# Patient Record
Sex: FEMALE | Race: WHITE | NOT HISPANIC OR LATINO | Employment: OTHER | URBAN - METROPOLITAN AREA
[De-identification: names, ages, dates, MRNs, and addresses within clinical notes are randomized per-mention and may not be internally consistent; named-entity substitution may affect disease eponyms.]

---

## 2017-04-18 ENCOUNTER — COMMUNICATION - HEALTHEAST (OUTPATIENT)
Dept: TELEHEALTH | Facility: CLINIC | Age: 27
End: 2017-04-18

## 2017-04-18 ENCOUNTER — OFFICE VISIT - HEALTHEAST (OUTPATIENT)
Dept: FAMILY MEDICINE | Facility: CLINIC | Age: 27
End: 2017-04-18

## 2017-04-18 DIAGNOSIS — Z76.89 ENCOUNTER TO ESTABLISH CARE: ICD-10-CM

## 2017-04-18 DIAGNOSIS — Z00.00 ANNUAL PHYSICAL EXAM: ICD-10-CM

## 2017-04-18 DIAGNOSIS — Z30.015 ENCOUNTER FOR INITIAL PRESCRIPTION OF VAGINAL RING HORMONAL CONTRACEPTIVE: ICD-10-CM

## 2017-04-18 ASSESSMENT — MIFFLIN-ST. JEOR: SCORE: 1633.48

## 2017-04-23 ENCOUNTER — OFFICE VISIT - HEALTHEAST (OUTPATIENT)
Dept: FAMILY MEDICINE | Facility: CLINIC | Age: 27
End: 2017-04-23

## 2017-04-23 DIAGNOSIS — J20.8 VIRAL BRONCHITIS: ICD-10-CM

## 2017-04-27 ENCOUNTER — AMBULATORY - HEALTHEAST (OUTPATIENT)
Dept: LAB | Facility: CLINIC | Age: 27
End: 2017-04-27

## 2017-04-27 DIAGNOSIS — Z00.00 ANNUAL PHYSICAL EXAM: ICD-10-CM

## 2017-04-27 LAB
BKR LAB AP ABNORMAL BLEEDING: NO
BKR LAB AP BIRTH CONTROL/HORMONES: NORMAL
BKR LAB AP CERVICAL APPEARANCE: NORMAL
BKR LAB AP GYN ADEQUACY: NORMAL
BKR LAB AP GYN INTERPRETATION: NORMAL
BKR LAB AP GYN OTHER FINDINGS: NORMAL
BKR LAB AP HPV REFLEX: NORMAL
BKR LAB AP LMP: NORMAL
BKR LAB AP PATIENT STATUS: NORMAL
BKR LAB AP PREVIOUS ABNORMAL: NO
BKR LAB AP PREVIOUS NORMAL: NORMAL
CHOLEST SERPL-MCNC: 269 MG/DL
FASTING STATUS PATIENT QL REPORTED: YES
HDLC SERPL-MCNC: 48 MG/DL
HIGH RISK?: NO
LDLC SERPL CALC-MCNC: 205 MG/DL
PATH REPORT.COMMENTS IMP SPEC: NORMAL
RESULT FLAG (HE HISTORICAL CONVERSION): NORMAL
TRIGL SERPL-MCNC: 81 MG/DL

## 2017-04-28 ENCOUNTER — COMMUNICATION - HEALTHEAST (OUTPATIENT)
Dept: FAMILY MEDICINE | Facility: CLINIC | Age: 27
End: 2017-04-28

## 2017-06-19 ENCOUNTER — COMMUNICATION - HEALTHEAST (OUTPATIENT)
Dept: FAMILY MEDICINE | Facility: CLINIC | Age: 27
End: 2017-06-19

## 2017-06-19 ENCOUNTER — OFFICE VISIT - HEALTHEAST (OUTPATIENT)
Dept: FAMILY MEDICINE | Facility: CLINIC | Age: 27
End: 2017-06-19

## 2017-06-19 DIAGNOSIS — T14.90XA INJURY: ICD-10-CM

## 2017-06-19 DIAGNOSIS — R21 SKIN RASH: ICD-10-CM

## 2017-06-21 ENCOUNTER — COMMUNICATION - HEALTHEAST (OUTPATIENT)
Dept: FAMILY MEDICINE | Facility: CLINIC | Age: 27
End: 2017-06-21

## 2017-06-21 ENCOUNTER — AMBULATORY - HEALTHEAST (OUTPATIENT)
Dept: FAMILY MEDICINE | Facility: CLINIC | Age: 27
End: 2017-06-21

## 2017-07-17 ENCOUNTER — AMBULATORY - HEALTHEAST (OUTPATIENT)
Dept: FAMILY MEDICINE | Facility: CLINIC | Age: 27
End: 2017-07-17

## 2018-06-08 ENCOUNTER — OFFICE VISIT - HEALTHEAST (OUTPATIENT)
Dept: FAMILY MEDICINE | Facility: CLINIC | Age: 28
End: 2018-06-08

## 2018-06-08 DIAGNOSIS — J98.01 BRONCHOSPASM: ICD-10-CM

## 2018-06-08 DIAGNOSIS — J06.9 VIRAL URI WITH COUGH: ICD-10-CM

## 2018-06-08 ASSESSMENT — MIFFLIN-ST. JEOR: SCORE: 1708.33

## 2018-10-05 ENCOUNTER — OFFICE VISIT - HEALTHEAST (OUTPATIENT)
Dept: FAMILY MEDICINE | Facility: CLINIC | Age: 28
End: 2018-10-05

## 2018-10-05 DIAGNOSIS — R21 RASH: ICD-10-CM

## 2018-10-29 ENCOUNTER — OFFICE VISIT - HEALTHEAST (OUTPATIENT)
Dept: FAMILY MEDICINE | Facility: CLINIC | Age: 28
End: 2018-10-29

## 2018-10-29 DIAGNOSIS — G43.709 CHRONIC MIGRAINE WITHOUT AURA WITHOUT STATUS MIGRAINOSUS, NOT INTRACTABLE: ICD-10-CM

## 2019-03-14 ENCOUNTER — OFFICE VISIT - HEALTHEAST (OUTPATIENT)
Dept: FAMILY MEDICINE | Facility: CLINIC | Age: 29
End: 2019-03-14

## 2019-03-14 DIAGNOSIS — R10.84 ABDOMINAL PAIN, GENERALIZED: ICD-10-CM

## 2019-03-15 ENCOUNTER — AMBULATORY - HEALTHEAST (OUTPATIENT)
Dept: LAB | Facility: CLINIC | Age: 29
End: 2019-03-15

## 2019-03-15 DIAGNOSIS — R10.84 ABDOMINAL PAIN, GENERALIZED: ICD-10-CM

## 2019-03-18 LAB
G LAMBLIA AG STL QL IA: NORMAL
H PYLORI AG STL QL IA: NORMAL
O+P STL MICRO: NORMAL
REPORT STATUS: NORMAL
SPECIMEN DESCRIPTION: NORMAL

## 2019-03-19 ENCOUNTER — COMMUNICATION - HEALTHEAST (OUTPATIENT)
Dept: FAMILY MEDICINE | Facility: CLINIC | Age: 29
End: 2019-03-19

## 2019-03-21 ENCOUNTER — AMBULATORY - HEALTHEAST (OUTPATIENT)
Dept: LAB | Facility: CLINIC | Age: 29
End: 2019-03-21

## 2019-03-21 DIAGNOSIS — R10.84 ABDOMINAL PAIN, GENERALIZED: ICD-10-CM

## 2019-03-21 LAB
HEMOCCULT SP1 STL QL: NEGATIVE
HEMOCCULT SP2 STL QL: NEGATIVE
HEMOCCULT SP3 STL QL: NEGATIVE

## 2019-03-25 ENCOUNTER — COMMUNICATION - HEALTHEAST (OUTPATIENT)
Dept: FAMILY MEDICINE | Facility: CLINIC | Age: 29
End: 2019-03-25

## 2019-03-27 ENCOUNTER — COMMUNICATION - HEALTHEAST (OUTPATIENT)
Dept: FAMILY MEDICINE | Facility: CLINIC | Age: 29
End: 2019-03-27

## 2019-04-15 ENCOUNTER — COMMUNICATION - HEALTHEAST (OUTPATIENT)
Dept: FAMILY MEDICINE | Facility: CLINIC | Age: 29
End: 2019-04-15

## 2019-04-15 DIAGNOSIS — G43.709 CHRONIC MIGRAINE WITHOUT AURA WITHOUT STATUS MIGRAINOSUS, NOT INTRACTABLE: ICD-10-CM

## 2019-07-24 ENCOUNTER — OFFICE VISIT - HEALTHEAST (OUTPATIENT)
Dept: FAMILY MEDICINE | Facility: CLINIC | Age: 29
End: 2019-07-24

## 2019-07-24 DIAGNOSIS — M54.6 CHRONIC MIDLINE THORACIC BACK PAIN: ICD-10-CM

## 2019-07-24 DIAGNOSIS — G89.29 CHRONIC MIDLINE THORACIC BACK PAIN: ICD-10-CM

## 2019-07-26 ENCOUNTER — OFFICE VISIT - HEALTHEAST (OUTPATIENT)
Dept: PHYSICAL THERAPY | Facility: REHABILITATION | Age: 29
End: 2019-07-26

## 2019-07-26 DIAGNOSIS — M54.2 CHRONIC MIDLINE POSTERIOR NECK PAIN: ICD-10-CM

## 2019-07-26 DIAGNOSIS — G89.29 CHRONIC MIDLINE POSTERIOR NECK PAIN: ICD-10-CM

## 2019-07-26 DIAGNOSIS — M54.12 CERVICAL RADICULITIS: ICD-10-CM

## 2019-07-26 DIAGNOSIS — M79.18 MYOFASCIAL PAIN: ICD-10-CM

## 2019-07-30 ENCOUNTER — OFFICE VISIT - HEALTHEAST (OUTPATIENT)
Dept: PHYSICAL THERAPY | Facility: REHABILITATION | Age: 29
End: 2019-07-30

## 2019-07-30 DIAGNOSIS — G89.29 CHRONIC MIDLINE POSTERIOR NECK PAIN: ICD-10-CM

## 2019-07-30 DIAGNOSIS — M79.18 MYOFASCIAL PAIN: ICD-10-CM

## 2019-07-30 DIAGNOSIS — M54.12 CERVICAL RADICULITIS: ICD-10-CM

## 2019-07-30 DIAGNOSIS — M54.2 CHRONIC MIDLINE POSTERIOR NECK PAIN: ICD-10-CM

## 2019-08-02 ENCOUNTER — OFFICE VISIT - HEALTHEAST (OUTPATIENT)
Dept: PHYSICAL THERAPY | Facility: REHABILITATION | Age: 29
End: 2019-08-02

## 2019-08-02 DIAGNOSIS — M79.18 MYOFASCIAL PAIN: ICD-10-CM

## 2019-08-02 DIAGNOSIS — G89.29 CHRONIC MIDLINE POSTERIOR NECK PAIN: ICD-10-CM

## 2019-08-02 DIAGNOSIS — M54.12 CERVICAL RADICULITIS: ICD-10-CM

## 2019-08-02 DIAGNOSIS — M54.2 CHRONIC MIDLINE POSTERIOR NECK PAIN: ICD-10-CM

## 2019-08-06 ENCOUNTER — OFFICE VISIT - HEALTHEAST (OUTPATIENT)
Dept: PHYSICAL THERAPY | Facility: REHABILITATION | Age: 29
End: 2019-08-06

## 2019-08-06 DIAGNOSIS — M54.12 CERVICAL RADICULITIS: ICD-10-CM

## 2019-08-06 DIAGNOSIS — G89.29 CHRONIC MIDLINE POSTERIOR NECK PAIN: ICD-10-CM

## 2019-08-06 DIAGNOSIS — M54.2 CHRONIC MIDLINE POSTERIOR NECK PAIN: ICD-10-CM

## 2019-08-06 DIAGNOSIS — M79.18 MYOFASCIAL PAIN: ICD-10-CM

## 2019-08-13 ENCOUNTER — OFFICE VISIT - HEALTHEAST (OUTPATIENT)
Dept: PHYSICAL THERAPY | Facility: REHABILITATION | Age: 29
End: 2019-08-13

## 2019-08-13 DIAGNOSIS — M54.2 CHRONIC MIDLINE POSTERIOR NECK PAIN: ICD-10-CM

## 2019-08-13 DIAGNOSIS — M79.18 MYOFASCIAL PAIN: ICD-10-CM

## 2019-08-13 DIAGNOSIS — M54.12 CERVICAL RADICULITIS: ICD-10-CM

## 2019-08-13 DIAGNOSIS — G89.29 CHRONIC MIDLINE POSTERIOR NECK PAIN: ICD-10-CM

## 2019-08-27 ENCOUNTER — OFFICE VISIT - HEALTHEAST (OUTPATIENT)
Dept: PHYSICAL THERAPY | Facility: REHABILITATION | Age: 29
End: 2019-08-27

## 2019-08-27 DIAGNOSIS — M54.2 CHRONIC MIDLINE POSTERIOR NECK PAIN: ICD-10-CM

## 2019-08-27 DIAGNOSIS — M54.12 CERVICAL RADICULITIS: ICD-10-CM

## 2019-08-27 DIAGNOSIS — M79.18 MYOFASCIAL PAIN: ICD-10-CM

## 2019-08-27 DIAGNOSIS — G89.29 CHRONIC MIDLINE POSTERIOR NECK PAIN: ICD-10-CM

## 2019-09-06 ENCOUNTER — OFFICE VISIT - HEALTHEAST (OUTPATIENT)
Dept: PHYSICAL THERAPY | Facility: REHABILITATION | Age: 29
End: 2019-09-06

## 2019-09-06 DIAGNOSIS — M79.18 MYOFASCIAL PAIN: ICD-10-CM

## 2019-09-06 DIAGNOSIS — G89.29 CHRONIC MIDLINE POSTERIOR NECK PAIN: ICD-10-CM

## 2019-09-06 DIAGNOSIS — M54.2 CHRONIC MIDLINE POSTERIOR NECK PAIN: ICD-10-CM

## 2019-09-06 DIAGNOSIS — M54.12 CERVICAL RADICULITIS: ICD-10-CM

## 2019-09-10 ENCOUNTER — OFFICE VISIT - HEALTHEAST (OUTPATIENT)
Dept: PHYSICAL THERAPY | Facility: REHABILITATION | Age: 29
End: 2019-09-10

## 2019-09-10 DIAGNOSIS — M54.12 CERVICAL RADICULITIS: ICD-10-CM

## 2019-09-10 DIAGNOSIS — M79.18 MYOFASCIAL PAIN: ICD-10-CM

## 2019-09-10 DIAGNOSIS — M54.2 CHRONIC MIDLINE POSTERIOR NECK PAIN: ICD-10-CM

## 2019-09-10 DIAGNOSIS — G89.29 CHRONIC MIDLINE POSTERIOR NECK PAIN: ICD-10-CM

## 2019-09-17 ENCOUNTER — OFFICE VISIT - HEALTHEAST (OUTPATIENT)
Dept: FAMILY MEDICINE | Facility: CLINIC | Age: 29
End: 2019-09-17

## 2019-09-17 ENCOUNTER — OFFICE VISIT - HEALTHEAST (OUTPATIENT)
Dept: PHYSICAL THERAPY | Facility: REHABILITATION | Age: 29
End: 2019-09-17

## 2019-09-17 DIAGNOSIS — M54.12 CERVICAL RADICULITIS: ICD-10-CM

## 2019-09-17 DIAGNOSIS — G89.29 CHRONIC MIDLINE POSTERIOR NECK PAIN: ICD-10-CM

## 2019-09-17 DIAGNOSIS — M54.6 CHRONIC MIDLINE THORACIC BACK PAIN: ICD-10-CM

## 2019-09-17 DIAGNOSIS — M54.2 CHRONIC MIDLINE POSTERIOR NECK PAIN: ICD-10-CM

## 2019-09-17 DIAGNOSIS — M79.18 MYOFASCIAL PAIN: ICD-10-CM

## 2019-09-17 DIAGNOSIS — G89.29 CHRONIC MIDLINE THORACIC BACK PAIN: ICD-10-CM

## 2019-09-17 DIAGNOSIS — G43.709 CHRONIC MIGRAINE WITHOUT AURA WITHOUT STATUS MIGRAINOSUS, NOT INTRACTABLE: ICD-10-CM

## 2019-09-17 RX ORDER — SUMATRIPTAN 50 MG/1
50 TABLET, FILM COATED ORAL
Qty: 30 TABLET | Refills: 6 | Status: ON HOLD | OUTPATIENT
Start: 2019-09-17 | End: 2021-10-07

## 2019-09-17 RX ORDER — CYCLOBENZAPRINE HCL 5 MG
5 TABLET ORAL EVERY 8 HOURS PRN
Qty: 30 TABLET | Refills: 1 | Status: ON HOLD | OUTPATIENT
Start: 2019-09-17 | End: 2021-10-07

## 2019-09-24 ENCOUNTER — OFFICE VISIT - HEALTHEAST (OUTPATIENT)
Dept: PHYSICAL MEDICINE AND REHAB | Facility: REHABILITATION | Age: 29
End: 2019-09-24

## 2019-09-24 DIAGNOSIS — M79.18 MYOFASCIAL PAIN: ICD-10-CM

## 2019-09-24 DIAGNOSIS — G44.219 EPISODIC TENSION-TYPE HEADACHE, NOT INTRACTABLE: ICD-10-CM

## 2019-09-24 DIAGNOSIS — M54.2 CERVICALGIA: ICD-10-CM

## 2019-09-24 DIAGNOSIS — R29.2 HYPERREFLEXIA: ICD-10-CM

## 2019-09-24 RX ORDER — CALCIUM CARBONATE 500(1250)
TABLET,CHEWABLE ORAL
Status: ON HOLD | COMMUNITY
Start: 2019-09-24 | End: 2021-10-11

## 2019-09-24 RX ORDER — BREAST PUMP
EACH MISCELLANEOUS
Status: ON HOLD | COMMUNITY
Start: 2016-11-14 | End: 2021-10-07

## 2019-09-24 ASSESSMENT — MIFFLIN-ST. JEOR: SCORE: 1771.83

## 2019-10-03 ENCOUNTER — HOSPITAL ENCOUNTER (OUTPATIENT)
Dept: MRI IMAGING | Facility: CLINIC | Age: 29
Discharge: HOME | End: 2019-10-03
Attending: PHYSICAL MEDICINE & REHABILITATION

## 2019-10-03 DIAGNOSIS — G44.219 EPISODIC TENSION-TYPE HEADACHE, NOT INTRACTABLE: ICD-10-CM

## 2019-10-03 DIAGNOSIS — R29.2 HYPERREFLEXIA: ICD-10-CM

## 2019-10-03 DIAGNOSIS — M54.2 CERVICALGIA: ICD-10-CM

## 2019-10-04 ENCOUNTER — COMMUNICATION - HEALTHEAST (OUTPATIENT)
Dept: PHYSICAL MEDICINE AND REHAB | Facility: CLINIC | Age: 29
End: 2019-10-04

## 2019-10-11 ENCOUNTER — HOSPITAL ENCOUNTER (OUTPATIENT)
Dept: PHYSICAL MEDICINE AND REHAB | Facility: CLINIC | Age: 29
Discharge: HOME | End: 2019-10-11
Attending: PHYSICAL MEDICINE & REHABILITATION

## 2019-10-11 DIAGNOSIS — G44.219 EPISODIC TENSION-TYPE HEADACHE, NOT INTRACTABLE: ICD-10-CM

## 2019-10-11 DIAGNOSIS — M54.2 CERVICALGIA: ICD-10-CM

## 2019-10-11 DIAGNOSIS — R29.2 HYPERREFLEXIA: ICD-10-CM

## 2019-10-11 ASSESSMENT — MIFFLIN-ST. JEOR: SCORE: 1771.83

## 2021-03-23 LAB
ABO (EXTERNAL): NORMAL
HEMOGLOBIN (EXTERNAL): 12.7 G/DL (ref 11–14)
HEPATITIS B SURFACE ANTIGEN (EXTERNAL): NONREACTIVE
HIV1+2 AB SERPL QL IA: NONREACTIVE
RH (EXTERNAL): POSITIVE
RUBELLA ANTIBODY IGG (EXTERNAL): NORMAL
TREPONEMA PALLIDUM ANTIBODY (EXTERNAL): NEGATIVE

## 2021-05-27 NOTE — TELEPHONE ENCOUNTER
----- Message from FATIMAH Pan sent at 3/25/2019 11:17 AM CDT -----  Negative result. No blood found in the stool.

## 2021-05-27 NOTE — TELEPHONE ENCOUNTER
Refill Approved    Rx renewed per Medication Renewal Policy. Medication was last renewed on 10/29/18.    Zohra Crawford, [facility] Triage/Med Refill 4/15/2019     Requested Prescriptions   Pending Prescriptions Disp Refills     SUMAtriptan (IMITREX) 50 MG tablet 30 tablet 1     Sig: Take 1 tablet (50 mg total) by mouth every 2 (two) hours as needed for migraine. Up to 200 mg/day.       Triptans Refill Protocol Passed - 4/15/2019 10:10 AM        Passed - PCP or prescribing provider visit in past 12 months       Last office visit with prescriber/PCP: 3/14/2019 Cintia Jensen FNP OR same dept: 3/14/2019 Cintia Jensen FNP OR same specialty: 3/14/2019 Cintia Jensen FNP  Last physical: Visit date not found Last MTM visit: Visit date not found   Next visit within 3 mo: Visit date not found  Next physical within 3 mo: Visit date not found  Prescriber OR PCP: FATIMAH Pan  Last diagnosis associated with med order: 1. Chronic migraine without aura without status migrainosus, not intractable  - SUMAtriptan (IMITREX) 50 MG tablet; Take 1 tablet (50 mg total) by mouth every 2 (two) hours as needed for migraine. Up to 200 mg/day.  Dispense: 30 tablet; Refill: 1    If protocol passes may refill for 12 months if within 3 months of last provider visit (or a total of 15 months).

## 2021-05-30 VITALS — BODY MASS INDEX: 31 KG/M2 | WEIGHT: 197.5 LBS | HEIGHT: 67 IN

## 2021-05-30 VITALS — WEIGHT: 199 LBS | BODY MASS INDEX: 31.17 KG/M2

## 2021-05-30 NOTE — PROGRESS NOTES
[facility] Daily Progress     Patient Name: Kavya Butts  Date: 7/30/2019  Visit #: 2/12  Referral Diagnosis: Midline neck pain  Referring provider: Cintia Jensen FNP  Visit Diagnosis:     ICD-10-CM    1. Chronic midline posterior neck pain M54.2     G89.29    2. Myofascial pain M79.18    3. Cervical radiculitis M54.12        Assessment:     Today patient returns for first follow up visit with decreased headaches, however having most pain in R scapular area today, TTP and found relief with manual therapy. Scapular strengthening and lat stretching was given as HEP today.    From Eval:  Patient is a 29 y.o. female that presents with signs and symptoms consistent with midline posterior neck pain, with headaches and bilateral arm numbness secondary to possible nerve impingement due to facet arthropathy or disc herniation, with myofascial restrictions. Patient demonstrates impairments including decreased cervical range of motion, flexibility and joint mobility R>L, with + median ULNTT, and TTP of suboccipitals and cervical paraspinals, leading to impaired functional mobility. Patient's functional limitations include sleeping at night, sitting at work, waking up with no numbness in her arms, performing daily household activities and turning her head to watch for traffic.      HEP/POC compliance is  good .  Patient demonstrates understanding/independence with home program.  Patient is appropriate to continue with skilled physical therapy intervention, as indicated by initial plan of care.    Goal Status:  Pt. will be independent with home exercise program in : 4 weeks  Pt. will have improved quality of sleep: waking less times/night;with less pain;in 6 weeks  Pt. will improve posture : and demonstrate posture with minimal to no cuing;and maintain posture for;30 minutes;for working;in 6 weeks  Patient Turn Head: for driving;for conversation;with full ROM;with less pain;with less difficulty;in 6  weeks  Patient will look up / down: for reading;for computer work;with no pain;with less difficulty;in 6 weeks    Pt will: demonstrate improved myofascial mobility and decreased TTP of R>L cervical paraspinals and UT by 6 weeks.         Plan / Patient Education:     Continue with initial plan of care.  Progress with home program as tolerated.    migue for next visit: review HEP, trial ulnar nerve glides, scapular strengthening, manual to cervical spine, trial Ktape or TENS unit if needed, prone scap and cervical retraction    Subjective:     Pain Rating: low burn with constant ache that makes her want to throw up a little bit  Patient reports she is feeling worse, but she is feeling better in spots. Tired and sensitive on Friday, but otherwise it felt good. Patient has been doing her exercises 2-3x per day. Sitting has been really hard for her.     Functional limitations are described as occurring with:   looking down and turning head  performing routine daily activities  sitting for longer periods of time  sleeping - she wakes up with her arms numb      Objective:        Cervical ROM:            Date: 7/26/2019       *Indicate scale AROM AROM AROM   Cervical Flexion 45 P on L       Cervical Extension 45         Right Left Right Left Right Left   Cervical Sidebending 40 Pulls on L 50           Cervical Rotation 50 85             Flexibility/Tissue Extensibility: decreased R>L cervical paraspinals, suboccipitals, UT  Palpation: TTP at cervical paraspinals, suboccipitals and UT R>L  Passive Mobility-Joint Integrity: Hypomobile.  with slight rotation R>L    Treatment Today       Patient Education: Patient was educated on continuing plan of care, progress and review of current HEP. Patient educated on importance of consistency with exercise and therapy, as well as activity modification in order to see change and improvements. Patient demonstrated and verbalized understanding.     Manual Therapy:  SL on L, R scapular MFR -  TTP but found relief  MFR of cervical paraspinals and suboccipitals on R  Gentle cervical upglides on R to increase motion - patient found relief    Exercises:  Exercise #1: supine or seated chin tuck - hold 3 sec x 10  Comment #1: supine pec stretch with breathing - 10 breaths  Exercise #2: UT and levator stretch - hold 30 sec x 2  Comment #2: pec doorway stretch - hold 30 sec  Exercise #3: median nerve glides, tensioners and rockers - 10-20 reps  Comment #3: theraband row/extension - 10-30  Exercise #4: lat stretch at sink - hold 30 sec  Comment #4: seated lat stretch with lumbar flexion - hold 30 sec        TREATMENT MINUTES COMMENTS   Evaluation     Self-care/ Home management     Manual therapy 15 SL on L, R scapular MFR - TTP but found relief  MFR of cervical paraspinals and suboccipitals on R  Gentle cervical upglides on R to increase motion - patient found relief   Neuromuscular Re-education     Therapeutic Activity     Therapeutic Exercises 15 See above flowsheet   Gait training     Modality__________________                Total 30    Blank areas are intentional and mean the treatment did not include these items.       Therese Alex, PT  7/30/2019

## 2021-05-30 NOTE — PROGRESS NOTES
[facility]   Cervical Thoracic Initial Evaluation    Patient Name: Kavya Butts  Date of evaluation: 7/26/2019  Referral Diagnosis: Chronic midline thoracic back pain  Referring provider: Cintia Jensen FNP  Visit Diagnosis:     ICD-10-CM    1. Chronic midline posterior neck pain M54.2     G89.29    2. Myofascial pain M79.18    3. Cervical radiculitis M54.12        Assessment:        Patient is a 29 y.o. female that presents with signs and symptoms consistent with midline posterior neck pain, with headaches and bilateral arm numbness secondary to possible nerve impingement due to facet arthropathy or disc herniation, with myofascial restrictions. Patient demonstrates impairments including decreased cervical range of motion, flexibility and joint mobility R>L, with + median ULNTT, and TTP of suboccipitals and cervical paraspinals, leading to impaired functional mobility. Patient's functional limitations include sleeping at night, sitting at work, waking up with no numbness in her arms, performing daily household activities and turning her head to watch for traffic. Today patient responded well to patient education, manual therapy and therapeutic exercise, reporting decreased pain and headache after treatment today.    Patient educated, demonstrated understanding and is in agreement with nature of impairment, plan of care, patient role and HEP. Patient compliant with PT and prognosis is good. Patient would benefit from skilled PT to progress and improve above impairments.    The POC is dynamic and will be modified on an ongoing basis.  Patient will return to clinic if symptoms persist.  Barriers to achieving goals as noted in the assessment section may affect outcome.  Prognosis to achieve goals is  good   Pt. is appropriate for skilled PT intervention as outlined in the Plan of Care (POC).  Pt. is a good candidate for skilled PT services to improve pain levels and function.    Goals:  Pt. will be  independent with home exercise program in : 4 weeks  Pt. will have improved quality of sleep: waking less times/night;with less pain;in 6 weeks  Pt. will improve posture : and demonstrate posture with minimal to no cuing;and maintain posture for;30 minutes;for working;in 6 weeks  Patient Turn Head: for driving;for conversation;with full ROM;with less pain;with less difficulty;in 6 weeks  Patient will look up / down: for reading;for computer work;with no pain;with less difficulty;in 6 weeks    Pt will: demonstrate improved myofascial mobility and decreased TTP of R>L cervical paraspinals and UT by 6 weeks.       Patient's expectations/goals are realistic.    Barriers to Learning or Achieving Goals:  Chronicity of the problem.       Plan / Patient Instructions:        Plan of Care:   Communication with: Referral Source  Patient Related Instruction: Nature of Condition;Treatment plan and rationale;Self Care instruction;Basis of treatment;Body mechanics;Posture;Next steps;Expected outcome  Times per Week: 1-2  Number of Weeks: 6-8  Number of Visits: 12  Discharge Planning: independent HEP and/or plateau of progress  Therapeutic Exercise: ROM;Stretching;Strengthening  Neuromuscular Reeducation: kinesio tape;posture;balance/proprioception;TNE;core  Manual Therapy: soft tissue mobilization;myofascial release;joint mobilization;muscle energy  Modalities: TENS  Gait Training: as indicated      POC and pathology of condition were reviewed with patient.  Pt. is in agreement with the Plan of Care  A Home Exercise Program (HEP) was initiated today.  Pt. was instructed in exercises by PT and patient was given a handout with detailed instructions.    Plan for next visit: review HEP, trial ulnar nerve glides, scapular strengthening, manual to cervical spine, trial Ktape or TENS unit if needed, prone scap and cervical retraction     Subjective:         History of Present Illness:    Kavya is a 29 y.o. female who presents to therapy  today with complaints of upper back/neck type pain. Date of onset is 2018 and onset was gradual. Symptoms are constant and not improving. Patient reports it started when her arms started getting numb, at then at one point she has headaches and stiffness, she notices it increases with stress. She started going to the chiropractor around January and is going 1x week, it seems to help. She denies history of similar symptoms. She describes their previous level of function as not limited. Patient had taken a new job in 2017 and it has been very high stress and sitting at a computer all day long. Patient does wake up with numbness every morning, more pain every night, took the meds and didn't wake up with it this morning. Patient does like to read and do scuba diving.     Pain Ratin  Pain rating at best: 2  Pain rating at worst: 9  Pain description: dull throbbing in the base of her neck, arms are shooting when she is awake then they go numb, she feels pressure along her wrists and sends an achy feeling    Functional limitations are described as occurring with:   looking down and turning head  performing routine daily activities  sitting for longer periods of time  sleeping - she wakes up with her arms numb    Patient reports benefit from:  she does put on braces for her wrists when she does , she uses ice, chiropractor helps         Objective:      Note: Items left blank indicates the item was not performed or not indicated at the time of the evaluation.    Patient Outcome Measures :    Neck Disability Score in %: 20     Scores range from 0-100%, where a score of 0% represents minimal pain and maximal function. The minmal clinically important difference is a score reduction of 10%.    Cervical Thoracic Examination  1. Chronic midline posterior neck pain     2. Myofascial pain     3. Cervical radiculitis       Involved side: Right, bilateral  Posture Observation:      General sitting posture is   fair.  General standing posture is fair.    Cervical ROM:   Date: 7/26/2019     *Indicate scale AROM AROM AROM   Cervical Flexion 45 P on L     Cervical Extension 45      Right Left Right Left Right Left   Cervical Sidebending 40 Pulls on L 50       Cervical Rotation 50 85       Cervical Protraction      Cervical Retraction      Thoracic Flexion      Thoracic Extension      Thoracic Sidebending         Thoracic Rotation           Strength     Date: 7/26/2019     Cervical Myotomes/5 Right Left Right Left Right Left   Cervical Flexion (C1-2)         Cervical Sidebending (C3)         Shoulder Elevation (C4)         Shoulder Abduction (C5)         Elbow Flexion (C6)         Elbow Extension (C7)         Wrist Flexion (C7) 4 4       Wrist Extension (C6) 4 4       Thumb abduction (C8)         Finger Abduction (T1) diminished diminished         Sensation         Reflex Testing  Cervical Dermatomes Right Left UE Reflexes Right Left   Back of the Head (C2)   Biceps (C5-6)     Supraclavicular Fossa (C3)   Brachioradialis (C5-6)     AC Joint (C4) Slight P diminished Triceps (C7-8)     Lateral Biceps (C5)  diminished Nan s test     Palmar Thumb (C6)   LE Reflexes     Palmar 3rd Finger (C7)   Patellar (L3-4)     Palmar 5th Finger (C8)   Achilles (S1-2)     Ulnar Forearm (T1)   Babinski Response         Cervical Special Tests     Cervical Special Tests Right Left UE Nerve Mobility Right Left   Cervical compression   Median nerve + +   Cervical distraction   Ulnar nerve - -   Spurling s test   Radial nerve     Shoulder abduction sign   Thoracic outlet     Deep neck flexor endurance test   Tim     Upper cervical rotation   Adson s     Sharper-Joyce   Cervical rotation lateral flexion     Alar ligament test   Other:     Other:   Other:         Flexibility/Tissue Extensibility: decreased R>L cervical paraspinals, suboccipitals, UT  Palpation: TTP at cervical paraspinals, suboccipitals and UT R>L  Passive Mobility-Joint  Integrity: Hypomobile.  with slight rotation R>L      Treatment Today      Patient Education: Patient educated on plan of care, prognosis, PT/patient role and HEP. Patient educated on impairments related to condition and reproduction of symptoms. Patient instructed to focus on the small goals and this may be a long process to recovery, and that exercises at home are just as important as coming to therapy. Patient was educated on importance of exercise consistency and activity modification in order to see progress and change. Patient demonstrated and verbalized understanding.     Manual Therapy:  MFR of cervical paraspinals and suboccipitals on R  Gentle cervical upglides on R to increase motion - patient found relief    Exercises:  Exercise #1: supine or seated chin tuck - hold 3 sec x 10  Comment #1: supine pec stretch with breathing - 10 breaths  Exercise #2: UT and levator stretch - hold 30 sec x 2  Comment #2: pec doorway stretch - hold 30 sec  Exercise #3: median nerve glides, tensioners and rockers - 10-20 reps        TREATMENT MINUTES COMMENTS   Evaluation 20    Self-care/ Home management     Manual therapy 15 MFR of cervical paraspinals and suboccipitals on R  Gentle cervical upglides on R to increase motion - patient found relief   Neuromuscular Re-education     Therapeutic Activity     Therapeutic Exercises 15 See flowsheet  edu on sleeping posture   Gait training     Modality__________________                Total 50    Blank areas are intentional and mean the treatment did not include these items.     PT Evaluation Code: (Please list factors)  Patient History/Comorbidities: obesity  Examination: decreased motion increased pain  Clinical Presentation: stable  Clinical Decision Making: low    Patient History/  Comorbidities Examination  (body structures and functions, activity limitations, and/or participation restrictions) Clinical Presentation Clinical Decision Making (Complexity)   No documented  Comorbidities or personal factors 1-2 Elements Stable and/or uncomplicated Low   1-2 documented comorbidities or personal factor 3 Elements Evolving clinical presentation with changing characteristics Moderate   3-4 documented comorbidities or personal factors 4 or more Unstable and unpredictable High                Therese Alex, PT  7/26/2019  8:06 AM

## 2021-05-30 NOTE — PROGRESS NOTES
Assessment:   1. Chronic midline thoracic back pain   Nonspecific upper back pain, possible sciatica   - Ambulatory referral to Adult PT- Internal  - cyclobenzaprine (FLEXERIL) 5 MG tablet; Take 1 tablet (5 mg total) by mouth every 8 (eight) hours as needed for muscle spasms.  Dispense: 30 tablet; Refill: 1    Plan:   Natural history and expected course discussed. Questions answered.  Proper lifting, bending technique discussed.  Stretching exercises discussed.  Regular aerobic and trunk strengthening exercises discussed.  Heat to affected area as needed for local pain relief.  NSAIDs per medication orders.  Muscle relaxants per medication orders.  PT referral.  Follow-up in 6 weeks.     Subjective:   Kavya Butts is a 29 y.o. female who presents for evaluation of upper back pain. The patient has had recurrent self limited episodes of low back pain in the past. Symptoms have been present for several months and are unchanged.  Onset was related to / precipitated by no known injury. The pain is located in the mid upper back and radiates to the right arm, left arm, right hand, left hand. The pain is described as aching and sharp and occurs intermittently. She rates her pain as moderate. Symptoms are exacerbated by sitting. Symptoms are improved by nothing. She has also tried NSAIDs which provided no symptom relief. She has tingling in both hands and pain with activity associated with the back pain.     The following portions of the patient's history were reviewed and updated as appropriate: allergies, current medications, past family history, past medical history, past social history, past surgical history and problem list.    Review of Systems  Pertinent items are noted in HPI.      Objective:    Full range of motion without pain, no tenderness, no spasm, no curvature.  Normal reflexes, gait, strength and negative straight-leg raise.  Inspection and palpation: inspection of back is normal.  Muscle tone and ROM exam:  limited range of motion with pain.  Neurological: normal DTRs, muscle strength and reflexes, right knee reflex normal, left knee reflex normal, bilateral elbow reflexes was normal.

## 2021-05-31 VITALS — WEIGHT: 210 LBS | BODY MASS INDEX: 32.89 KG/M2

## 2021-05-31 NOTE — PROGRESS NOTES
[facility] Daily Progress     Patient Name: Kavya Butts  Date: 8/13/2019  Visit #: 5/12  PTA# 1  Referral Diagnosis: Midline neck pain  Referring provider: Cintia Jensen FNP  Visit Diagnosis:     ICD-10-CM    1. Chronic midline posterior neck pain M54.2     G89.29    2. Myofascial pain M79.18    3. Cervical radiculitis M54.12        Assessment:     Patient with almost no pain this last week, slight headache today but improved with manual therapy treatment. Patient would benefit from some scapular strengthening exercises, especially since she wants to get into working out appropriately without increasing her neck symptoms.     From Eval:  Patient is a 29 y.o. female that presents with signs and symptoms consistent with midline posterior neck pain, with headaches and bilateral arm numbness secondary to possible nerve impingement due to facet arthropathy or disc herniation, with myofascial restrictions. Patient demonstrates impairments including decreased cervical range of motion, flexibility and joint mobility R>L, with + median ULNTT, and TTP of suboccipitals and cervical paraspinals, leading to impaired functional mobility. Patient's functional limitations include sleeping at night, sitting at work, waking up with no numbness in her arms, performing daily household activities and turning her head to watch for traffic.      HEP/POC compliance is  good .  Patient demonstrates understanding/independence with home program.  Patient is appropriate to continue with skilled physical therapy intervention, as indicated by initial plan of care.    Goal Status:  Pt. will be independent with home exercise program in : 4 weeks  Pt. will have improved quality of sleep: waking less times/night;with less pain;in 6 weeks  Pt. will improve posture : and demonstrate posture with minimal to no cuing;and maintain posture for;30 minutes;for working;in 6 weeks  Patient Turn Head: for driving;for conversation;with full  "ROM;with less pain;with less difficulty;in 6 weeks  Patient will look up / down: for reading;for computer work;with no pain;with less difficulty;in 6 weeks    Pt will: demonstrate improved myofascial mobility and decreased TTP of R>L cervical paraspinals and UT by 6 weeks.         Plan / Patient Education:     Continue with initial plan of care.  Progress with home program as tolerated.    migue for next visit: review HEP, review nerve glides, scapular strengthening, manual to cervical spine, trial Ktape or TENS unit if needed, prone scap and cervical retraction    Subjective:     Patient doesn't feel that her arms have gone numb since she started PT. She feels a little in the base of the skull, headache right now, but otherwise only 2 in the last week, and really no pain in the last week. She started working out now, doing the stationary bike.      Neck hurts today, been hurting a lot but it has been functional, trying to only take the meds when she sleeps. As far as arms go she has been feeling tension and tightness at the bottom of her arms, L>R. Hands haven't gone numb, but her neck is causing her more pain.     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    Functional limitations are described as occurring with:   looking down and turning head  performing routine daily activities  sitting for longer periods of time  sleeping - she wakes up with her arms numb      Objective:        Cervical ROM:            Date: 7/26/2019 8/2/19      *Indicate scale AROM AROM AROM   Cervical Flexion 45 P on L 45 \"pulling\"      Cervical Extension 45         Right Left Right Left Right Left   Cervical Sidebending 40 Pulls on L 50 40  53        Cervical Rotation 50 85 60  85          Flexibility/Tissue Extensibility: decreased R>L cervical paraspinals, suboccipitals, UT  Palpation: TTP at cervical paraspinals, suboccipitals and UT R>L  Passive Mobility-Joint Integrity: Hypomobile.  with slight rotation " R>L    Treatment Today       Patient Education: Pt instructed to get up from sitting every 30 minutes when at work.    Exercises:  Exercise #1: supine or seated chin tuck - hold 3 sec x 10  Comment #1: supine pec stretch with breathing - 10 breaths  Exercise #2: UT and levator stretch - hold 30 sec x 2  Comment #2: pec doorway stretch - hold 30 sec  Exercise #3: median nerve glides, tensioners and rockers - 10-20 reps  Comment #3: theraband row/extension - 10-30  Exercise #4: lat stretch at sink - hold 30 sec  Comment #4: seated lat stretch with lumbar flexion - hold 30 sec  Exercise #5: ulnar nerve glides - hand rotation not butterfly x 10  Comment #5: cervical isometrics 4 way - hold 5 sec x 5-10 reps         TREATMENT MINUTES COMMENTS   Evaluation     Self-care/ Home management     Manual therapy 16 Pt supine  MFR/STM to posterior cervical musculature, upper traps  Gentle suboccipital release with slight axial distraction   Neuromuscular Re-education     Therapeutic Activity     Therapeutic Exercises 12 See flow sheet  Arm bike 3 min  Added to HEP:  -cervical isometrics      Gait training     Modality__________________                Total 28    Blank areas are intentional and mean the treatment did not include these items.       Therese Alex, PT  8/13/2019

## 2021-05-31 NOTE — PROGRESS NOTES
[facility] Daily Progress     Patient Name: Kavya Butts  Date: 8/2/2019  Visit #: 3/12  PTA#1  Referral Diagnosis: Midline neck pain  Referring provider: Cintia Jensen FNP  Visit Diagnosis:     ICD-10-CM    1. Chronic midline posterior neck pain M54.2     G89.29    2. Myofascial pain M79.18    3. Cervical radiculitis M54.12        Assessment:         From Eval:  Patient is a 29 y.o. female that presents with signs and symptoms consistent with midline posterior neck pain, with headaches and bilateral arm numbness secondary to possible nerve impingement due to facet arthropathy or disc herniation, with myofascial restrictions. Patient demonstrates impairments including decreased cervical range of motion, flexibility and joint mobility R>L, with + median ULNTT, and TTP of suboccipitals and cervical paraspinals, leading to impaired functional mobility. Patient's functional limitations include sleeping at night, sitting at work, waking up with no numbness in her arms, performing daily household activities and turning her head to watch for traffic.    Pt with increased pain and headache today.   Subjective report of sitting for 8 hours without a break when at work.  Pt reporting decreased pain after manual therapy. Pt did report increased once sitting after treatment.      HEP/POC compliance is  good .  Patient demonstrates understanding/independence with home program.  Patient is appropriate to continue with skilled physical therapy intervention, as indicated by initial plan of care.    Goal Status:  Pt. will be independent with home exercise program in : 4 weeks  Pt. will have improved quality of sleep: waking less times/night;with less pain;in 6 weeks  Pt. will improve posture : and demonstrate posture with minimal to no cuing;and maintain posture for;30 minutes;for working;in 6 weeks  Patient Turn Head: for driving;for conversation;with full ROM;with less pain;with less difficulty;in 6  "weeks  Patient will look up / down: for reading;for computer work;with no pain;with less difficulty;in 6 weeks    Pt will: demonstrate improved myofascial mobility and decreased TTP of R>L cervical paraspinals and UT by 6 weeks.         Plan / Patient Education:     Continue with initial plan of care.  Progress with home program as tolerated.    migue for next visit: review HEP, review nerve glides, scapular strengthening, manual to cervical spine, trial Ktape or TENS unit if needed, prone scap and cervical retraction    Subjective:     Pain Rating: \"close to a 10\" Pt woke with a HA  Pt states she only worked 2 hours due to the pain. Pt reports taking all of her meds and icing.  Pt states it has not been this bad in a while. Pt reports she never knows when this increase will happen.  Pt reports all of the pain is in her posterior neck. Pt is not having numbness.  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    Functional limitations are described as occurring with:   looking down and turning head  performing routine daily activities  sitting for longer periods of time  sleeping - she wakes up with her arms numb      Objective:        Cervical ROM:            Date: 7/26/2019 8/2/19      *Indicate scale AROM AROM AROM   Cervical Flexion 45 P on L 45 \"pulling\"      Cervical Extension 45         Right Left Right Left Right Left   Cervical Sidebending 40 Pulls on L 50 40  53        Cervical Rotation 50 85 60  85          Flexibility/Tissue Extensibility: decreased R>L cervical paraspinals, suboccipitals, UT  Palpation: TTP at cervical paraspinals, suboccipitals and UT R>L  Passive Mobility-Joint Integrity: Hypomobile.  with slight rotation R>L    Treatment Today       Patient Education: Pt instructed to get up from sitting every 30 minutes when at work.        Exercises:  Exercise #1: supine or seated chin tuck - hold 3 sec x 10  Comment #1: supine pec stretch with breathing - 10 breaths  Exercise #2: " UT and levator stretch - hold 30 sec x 2  Comment #2: pec doorway stretch - hold 30 sec  Exercise #3: median nerve glides, tensioners and rockers - 10-20 reps  Comment #3: theraband row/extension - 10-30  Exercise #4: lat stretch at sink - hold 30 sec  Comment #4: seated lat stretch with lumbar flexion - hold 30 sec  Exercise #5: ulnar nerve glides  Comment #5: Butterfly x10        TREATMENT MINUTES COMMENTS   Evaluation     Self-care/ Home management     Manual therapy 15 Pt supine  MFR/STM to posterior cervical musculature, upper traps  Gentle suboccipital release   Neuromuscular Re-education     Therapeutic Activity     Therapeutic Exercises 15 See flow sheet  Added to HEP:  -ulnar nerve glides (butterfly)   Gait training     Modality__________________                Total 30    Blank areas are intentional and mean the treatment did not include these items.       Es Angulo, PTA,CLT  8/2/2019

## 2021-05-31 NOTE — PROGRESS NOTES
[facility] Daily Progress     Patient Name: Kavya Butts  Date: 8/27/2019  Visit #: 6/12  PTA# 1  Referral Diagnosis: Midline neck pain  Referring provider: Cintia Jensen FNP  Visit Diagnosis:     ICD-10-CM    1. Chronic midline posterior neck pain M54.2     G89.29    2. Myofascial pain M79.18    3. Cervical radiculitis M54.12        Assessment:     Patient missed last appt due to PT being sick, she can tell due to increased pain. She also was at a concert that she had to leave because of her neck pain. Patient trialed cervical mechanical traction today, found relief to help decrease symptoms. Will maybe try again if lasting effects. Patient appropriate to return to PCP and referral to spine center if pain increases and/or does not progress.      Patient would benefit from some scapular strengthening exercises, especially since she wants to get into working out appropriately without increasing her neck symptoms.     From Eval:  Patient is a 29 y.o. female that presents with signs and symptoms consistent with midline posterior neck pain, with headaches and bilateral arm numbness secondary to possible nerve impingement due to facet arthropathy or disc herniation, with myofascial restrictions. Patient demonstrates impairments including decreased cervical range of motion, flexibility and joint mobility R>L, with + median ULNTT, and TTP of suboccipitals and cervical paraspinals, leading to impaired functional mobility. Patient's functional limitations include sleeping at night, sitting at work, waking up with no numbness in her arms, performing daily household activities and turning her head to watch for traffic.      HEP/POC compliance is  good .  Patient demonstrates understanding/independence with home program.  Patient is appropriate to continue with skilled physical therapy intervention, as indicated by initial plan of care.    Goal Status:  Pt. will be independent with home exercise program in : 4  "weeks  Pt. will have improved quality of sleep: waking less times/night;with less pain;in 6 weeks  Pt. will improve posture : and demonstrate posture with minimal to no cuing;and maintain posture for;30 minutes;for working;in 6 weeks  Patient Turn Head: for driving;for conversation;with full ROM;with less pain;with less difficulty;in 6 weeks  Patient will look up / down: for reading;for computer work;with no pain;with less difficulty;in 6 weeks    Pt will: demonstrate improved myofascial mobility and decreased TTP of R>L cervical paraspinals and UT by 6 weeks.         Plan / Patient Education:     Continue with initial plan of care.  Progress with home program as tolerated.    migue for next visit: review HEP, review nerve glides, scapular strengthening, manual to cervical spine, trial Ktape or TENS unit if needed, prone scap and cervical retraction    Subjective:     Arms have gone numb 2x, just the right not the left. She was at a concert and her neck was so bad she had to leave the concert. As much as the stretches have been helping, she hasn't really found anything that has helped. Still in the base of her skull, headaches have gotten worse, but not bad where she had to stay home. Patient had gone to the chiropractor 3x/last week to get rid of the pain. Patient does have plans to go scuba diving this weekend.     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    Functional limitations are described as occurring with:   looking down and turning head  performing routine daily activities  sitting for longer periods of time  sleeping - she wakes up with her arms numb      Objective:        Cervical ROM:            Date: 7/26/2019 8/2/19      *Indicate scale AROM AROM AROM   Cervical Flexion 45 P on L 45 \"pulling\"      Cervical Extension 45         Right Left Right Left Right Left   Cervical Sidebending 40 Pulls on L 50 40  53        Cervical Rotation 50 85 60  85          Flexibility/Tissue " Extensibility: decreased R>L cervical paraspinals, suboccipitals, UT  Palpation: TTP at cervical paraspinals, suboccipitals and UT R>L  Passive Mobility-Joint Integrity: Hypomobile.  with slight rotation R>L    Treatment Today       Patient Education: Pt instructed to get up from sitting every 30 minutes when at work.    Exercises:  Exercise #1: supine or seated chin tuck - hold 3 sec x 10  Comment #1: supine pec stretch with breathing - 10 breaths  Exercise #2: UT and levator stretch - hold 30 sec x 2  Comment #2: pec doorway stretch - hold 30 sec  Exercise #3: median nerve glides, tensioners and rockers - 10-20 reps  Comment #3: theraband row/extension - 10-30  Exercise #4: lat stretch at sink - hold 30 sec  Comment #4: seated lat stretch with lumbar flexion - hold 30 sec  Exercise #5: ulnar nerve glides - hand rotation not butterfly x 10  Comment #5: cervical isometrics 4 way - hold 5 sec x 5-10 reps         TREATMENT MINUTES COMMENTS   Evaluation     Self-care/ Home management     Manual therapy 10 Pt supine  MFR/STM to posterior cervical musculature, upper traps  Gentle suboccipital release with slight axial distraction   Neuromuscular Re-education     Therapeutic Activity     Therapeutic Exercises 3 See flow sheet  Nustep 3 min  Added to HEP:  -cervical isometrics      Gait training     Modality__cervical mechanical traction ___ 15 Patient supine, 2nd notch, 20lb pull, 10 min static hold, 5 min explanation and set up              Total 28    Blank areas are intentional and mean the treatment did not include these items.       Therese Alex, PT  8/27/2019

## 2021-05-31 NOTE — PROGRESS NOTES
"[facility] Daily Progress     Patient Name: Kavya Butts  Date: 8/6/2019  Visit #: 4/12  PTA#1  Referral Diagnosis: Midline neck pain  Referring provider: Cintia Jensen FNP  Visit Diagnosis:     ICD-10-CM    1. Chronic midline posterior neck pain M54.2     G89.29    2. Myofascial pain M79.18    3. Cervical radiculitis M54.12        Assessment:     Patient with less pain than Friday, but still mostly neck pain than arm numbness/tingling, which PT reassured patient that is normal as we are trying to \"centralize\" her pain.     From Eval:  Patient is a 29 y.o. female that presents with signs and symptoms consistent with midline posterior neck pain, with headaches and bilateral arm numbness secondary to possible nerve impingement due to facet arthropathy or disc herniation, with myofascial restrictions. Patient demonstrates impairments including decreased cervical range of motion, flexibility and joint mobility R>L, with + median ULNTT, and TTP of suboccipitals and cervical paraspinals, leading to impaired functional mobility. Patient's functional limitations include sleeping at night, sitting at work, waking up with no numbness in her arms, performing daily household activities and turning her head to watch for traffic.      HEP/POC compliance is  good .  Patient demonstrates understanding/independence with home program.  Patient is appropriate to continue with skilled physical therapy intervention, as indicated by initial plan of care.    Goal Status:  Pt. will be independent with home exercise program in : 4 weeks  Pt. will have improved quality of sleep: waking less times/night;with less pain;in 6 weeks  Pt. will improve posture : and demonstrate posture with minimal to no cuing;and maintain posture for;30 minutes;for working;in 6 weeks  Patient Turn Head: for driving;for conversation;with full ROM;with less pain;with less difficulty;in 6 weeks  Patient will look up / down: for reading;for computer " "work;with no pain;with less difficulty;in 6 weeks    Pt will: demonstrate improved myofascial mobility and decreased TTP of R>L cervical paraspinals and UT by 6 weeks.         Plan / Patient Education:     Continue with initial plan of care.  Progress with home program as tolerated.    migue for next visit: review HEP, review nerve glides, scapular strengthening, manual to cervical spine, trial Ktape or TENS unit if needed, prone scap and cervical retraction    Subjective:     Friday she tried working, no matter what she did it had hurt so bad, she says it lasted about 11 hours, meds had helped, and anti-inflammitory. Neck hurts today, been hurting a lot but it has been functional, trying to only take the meds when she sleeps. As far as arms go she has been feeling tension and tightness at the bottom of her arms, L>R. Hands haven't gone numb, but her neck is causing her more pain.     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    Functional limitations are described as occurring with:   looking down and turning head  performing routine daily activities  sitting for longer periods of time  sleeping - she wakes up with her arms numb      Objective:        Cervical ROM:            Date: 7/26/2019 8/2/19      *Indicate scale AROM AROM AROM   Cervical Flexion 45 P on L 45 \"pulling\"      Cervical Extension 45         Right Left Right Left Right Left   Cervical Sidebending 40 Pulls on L 50 40  53        Cervical Rotation 50 85 60  85          Flexibility/Tissue Extensibility: decreased R>L cervical paraspinals, suboccipitals, UT  Palpation: TTP at cervical paraspinals, suboccipitals and UT R>L  Passive Mobility-Joint Integrity: Hypomobile.  with slight rotation R>L    Treatment Today       Patient Education: Pt instructed to get up from sitting every 30 minutes when at work.    Exercises:  Exercise #1: supine or seated chin tuck - hold 3 sec x 10  Comment #1: supine pec stretch with breathing - 10 " breaths  Exercise #2: UT and levator stretch - hold 30 sec x 2  Comment #2: pec doorway stretch - hold 30 sec  Exercise #3: median nerve glides, tensioners and rockers - 10-20 reps  Comment #3: theraband row/extension - 10-30  Exercise #4: lat stretch at sink - hold 30 sec  Comment #4: seated lat stretch with lumbar flexion - hold 30 sec  Exercise #5: ulnar nerve glides  Comment #5: Butterfly x10        TREATMENT MINUTES COMMENTS   Evaluation     Self-care/ Home management     Manual therapy 25 Pt supine  MFR/STM to posterior cervical musculature, upper traps  Gentle suboccipital release with slight axial distraction   Neuromuscular Re-education     Therapeutic Activity     Therapeutic Exercises 5 See flow sheet  Added to HEP:  -ulnar nerve glides not butterfly but arm rotation   Gait training     Modality__________________                Total 30    Blank areas are intentional and mean the treatment did not include these items.       Therese Alex, PT  8/6/2019

## 2021-06-01 VITALS — BODY MASS INDEX: 33.59 KG/M2 | HEIGHT: 67 IN | WEIGHT: 214 LBS

## 2021-06-01 NOTE — PROGRESS NOTES
[facility] Discharge Summary  Patient Name: Kavya Butts  Date: 10/23/2019  Referral Diagnosis: midline neck pain  Referring provider: Cintia Jensen FNP  Visit Diagnosis:   1. Chronic midline posterior neck pain     2. Myofascial pain     3. Cervical radiculitis         Goals:  Pt. will be independent with home exercise program in : 4 weeks  Pt. will have improved quality of sleep: waking less times/night;with less pain;in 6 weeks  Pt. will improve posture : and demonstrate posture with minimal to no cuing;and maintain posture for;30 minutes;for working;in 6 weeks  Patient Turn Head: for driving;for conversation;with full ROM;with less pain;with less difficulty;in 6 weeks  Patient will look up / down: for reading;for computer work;with no pain;with less difficulty;in 6 weeks    Pt will: demonstrate improved myofascial mobility and decreased TTP of R>L cervical paraspinals and UT by 6 weeks.       Patient was seen for 9 visits for physical therapy of midline neck pain from 7/26/19 to 9/17/19 with no follow up appointments.   The patient was instructed to follow up with physician's clinic.  The patient discontinued therapy, did not return.  No further therapy is required at this time.    Therapy will be discontinued at this time.  The patient will need a new referral to resume physical therapy treatment. Please see below for patient's current status.    Thank you for your referral.  Therese Alex, PT, DPT  10/23/2019   9:52 AM      [facility] Daily Progress     Patient Name: Kavya Butts  Date: 9/17/2019  Visit #: 9/12  Referral Diagnosis: Midline neck pain  Referring provider: Cintia Jensen FNP  Visit Diagnosis:     ICD-10-CM    1. Chronic midline posterior neck pain M54.2     G89.29    2. Myofascial pain M79.18    3. Cervical radiculitis M54.12        Assessment:     Patient reporting she wouldn't like to do traction again because she was sore for about 3-4 days. Patient feels  most of her pain is tension, going to chiropractor helps. Patient had just seen PCP, and she is going to see a specialist. Still feeling headaches and tightness, feels like a dull ache most of the time.      Patient would benefit from some scapular strengthening exercises, especially since she wants to get into working out appropriately without increasing her neck symptoms.     From Eval:  Patient is a 29 y.o. female that presents with signs and symptoms consistent with midline posterior neck pain, with headaches and bilateral arm numbness secondary to possible nerve impingement due to facet arthropathy or disc herniation, with myofascial restrictions. Patient demonstrates impairments including decreased cervical range of motion, flexibility and joint mobility R>L, with + median ULNTT, and TTP of suboccipitals and cervical paraspinals, leading to impaired functional mobility. Patient's functional limitations include sleeping at night, sitting at work, waking up with no numbness in her arms, performing daily household activities and turning her head to watch for traffic.      HEP/POC compliance is  good .  Patient demonstrates understanding/independence with home program.  Patient is appropriate to continue with skilled physical therapy intervention, as indicated by initial plan of care.    Goal Status:  Pt. will be independent with home exercise program in : 4 weeks  Pt. will have improved quality of sleep: waking less times/night;with less pain;in 6 weeks  Pt. will improve posture : and demonstrate posture with minimal to no cuing;and maintain posture for;30 minutes;for working;in 6 weeks  Patient Turn Head: for driving;for conversation;with full ROM;with less pain;with less difficulty;in 6 weeks  Patient will look up / down: for reading;for computer work;with no pain;with less difficulty;in 6 weeks    Pt will: demonstrate improved myofascial mobility and decreased TTP of R>L cervical paraspinals and UT by 6 weeks.  "        Plan / Patient Education:     Continue with initial plan of care.  Progress with home program as tolerated.    migue for next visit: review HEP, review nerve glides, scapular strengthening, manual to cervical spine, trial Ktape or TENS unit if needed, prone scap and cervical retraction    Subjective:     Patient still has increase and decrease of pain symptoms, unsure why and hard to predict, some due to posture and stress at work. Muscles are tense doesn't really feel like it is affecting it as much anymore, feels like mostly just her neck now.     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    Functional limitations are described as occurring with:   looking down and turning head  performing routine daily activities  sitting for longer periods of time  sleeping - she wakes up with her arms numb      Objective:        Cervical ROM:            Date: 7/26/2019 8/2/19      *Indicate scale AROM AROM AROM   Cervical Flexion 45 P on L 45 \"pulling\"      Cervical Extension 45         Right Left Right Left Right Left   Cervical Sidebending 40 Pulls on L 50 40  53        Cervical Rotation 50 85 60  85          Flexibility/Tissue Extensibility: decreased R>L cervical paraspinals, suboccipitals, UT  Palpation: TTP at cervical paraspinals, suboccipitals and UT R>L  Passive Mobility-Joint Integrity: Hypomobile.  with slight rotation R>L    Treatment Today       Patient Education: Pt instructed to get up from sitting every 30 minutes when at work.    Exercises:  Exercise #1: supine or seated chin tuck - hold 3 sec x 10  Comment #1: supine pec stretch with breathing - 10 breaths  Exercise #2: UT and levator stretch - hold 30 sec x 2  Comment #2: pec doorway stretch - hold 30 sec  Exercise #3: median nerve glides, tensioners and rockers - 10-20 reps  Comment #3: theraband row/extension - 10-30, progressed to green band  Exercise #4: lat stretch at sink - hold 30 sec  Comment #4: seated lat stretch with " lumbar flexion - hold 30 sec  Exercise #5: ulnar nerve glides - hand rotation not butterfly x 10  Comment #5: cervical isometrics 4 way - hold 5 sec x 5-10 reps   Exercise #6: prone scapular retraction - hold 5 sec x 10        TREATMENT MINUTES COMMENTS   Evaluation     Self-care/ Home management     Manual therapy 25 Pt supine  MFR/STM to posterior cervical musculature, upper traps  Gentle suboccipital release with slight axial distraction   Neuromuscular Re-education     Therapeutic Activity     Therapeutic Exercises 3 See flow sheet  Nustep 4 min  Added to HEP:  -prone scapular retraction       Gait training     Modality__cervical mechanical traction ___ Not today Patient supine, 2nd notch, 20lb pull, 10 min static hold, 5 min explanation and set up              Total 28    Blank areas are intentional and mean the treatment did not include these items.       Therese Alex, PT  9/17/2019

## 2021-06-01 NOTE — PROGRESS NOTES
[facility] Daily Progress     Patient Name: Kavya Butts  Date: 9/10/2019  Visit #: 8/12  Referral Diagnosis: Midline neck pain  Referring provider: Cintia Jensen FNP  Visit Diagnosis:     ICD-10-CM    1. Chronic midline posterior neck pain M54.2     G89.29    2. Myofascial pain M79.18    3. Cervical radiculitis M54.12        Assessment:     Patient reporting she wouldn't like to do traction again because she was sore for about 3-4 days. Patient feels most of her pain is tension, going to chiropractor helps. Patient appropriate to return to PCP and referral to spine center if pain increases and/or does not progress.      Patient would benefit from some scapular strengthening exercises, especially since she wants to get into working out appropriately without increasing her neck symptoms.     From Eval:  Patient is a 29 y.o. female that presents with signs and symptoms consistent with midline posterior neck pain, with headaches and bilateral arm numbness secondary to possible nerve impingement due to facet arthropathy or disc herniation, with myofascial restrictions. Patient demonstrates impairments including decreased cervical range of motion, flexibility and joint mobility R>L, with + median ULNTT, and TTP of suboccipitals and cervical paraspinals, leading to impaired functional mobility. Patient's functional limitations include sleeping at night, sitting at work, waking up with no numbness in her arms, performing daily household activities and turning her head to watch for traffic.      HEP/POC compliance is  good .  Patient demonstrates understanding/independence with home program.  Patient is appropriate to continue with skilled physical therapy intervention, as indicated by initial plan of care.    Goal Status:  Pt. will be independent with home exercise program in : 4 weeks  Pt. will have improved quality of sleep: waking less times/night;with less pain;in 6 weeks  Pt. will improve posture :  "and demonstrate posture with minimal to no cuing;and maintain posture for;30 minutes;for working;in 6 weeks  Patient Turn Head: for driving;for conversation;with full ROM;with less pain;with less difficulty;in 6 weeks  Patient will look up / down: for reading;for computer work;with no pain;with less difficulty;in 6 weeks    Pt will: demonstrate improved myofascial mobility and decreased TTP of R>L cervical paraspinals and UT by 6 weeks.         Plan / Patient Education:     Continue with initial plan of care.  Progress with home program as tolerated.    migue for next visit: review HEP, review nerve glides, scapular strengthening, manual to cervical spine, trial Ktape or TENS unit if needed, prone scap and cervical retraction    Subjective:     Patient feeling a little better, she feels headaches, but feels like that is neck getting stretched and most of her pain is tension from now on. It is the most stressful time at work right now, going on all day today since 4am. Educated to try doing more of a consistent schedule in order to see progress.     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    Functional limitations are described as occurring with:   looking down and turning head  performing routine daily activities  sitting for longer periods of time  sleeping - she wakes up with her arms numb      Objective:        Cervical ROM:            Date: 7/26/2019 8/2/19      *Indicate scale AROM AROM AROM   Cervical Flexion 45 P on L 45 \"pulling\"      Cervical Extension 45         Right Left Right Left Right Left   Cervical Sidebending 40 Pulls on L 50 40  53        Cervical Rotation 50 85 60  85          Flexibility/Tissue Extensibility: decreased R>L cervical paraspinals, suboccipitals, UT  Palpation: TTP at cervical paraspinals, suboccipitals and UT R>L  Passive Mobility-Joint Integrity: Hypomobile.  with slight rotation R>L    Treatment Today       Patient Education: Pt instructed to get up from " sitting every 30 minutes when at work.    Exercises:  Exercise #1: supine or seated chin tuck - hold 3 sec x 10  Comment #1: supine pec stretch with breathing - 10 breaths  Exercise #2: UT and levator stretch - hold 30 sec x 2  Comment #2: pec doorway stretch - hold 30 sec  Exercise #3: median nerve glides, tensioners and rockers - 10-20 reps  Comment #3: theraband row/extension - 10-30, progressed to green band  Exercise #4: lat stretch at sink - hold 30 sec  Comment #4: seated lat stretch with lumbar flexion - hold 30 sec  Exercise #5: ulnar nerve glides - hand rotation not butterfly x 10  Comment #5: cervical isometrics 4 way - hold 5 sec x 5-10 reps   Exercise #6: prone scapular retraction - hold 5 sec x 10        TREATMENT MINUTES COMMENTS   Evaluation     Self-care/ Home management     Manual therapy 25 Pt supine  MFR/STM to posterior cervical musculature, upper traps  Gentle suboccipital release with slight axial distraction   Neuromuscular Re-education     Therapeutic Activity     Therapeutic Exercises 3 See flow sheet  Nustep 4 min  Added to HEP:  -prone scapular retraction       Gait training     Modality__cervical mechanical traction ___ Not today Patient supine, 2nd notch, 20lb pull, 10 min static hold, 5 min explanation and set up              Total 28    Blank areas are intentional and mean the treatment did not include these items.       Therese Alex, PT  9/10/2019

## 2021-06-01 NOTE — PROGRESS NOTES
Assessment/Plan:      Diagnoses and all orders for this visit:    Cervicalgia  -     MR Brain With Without Contrast; Future; Expected date: 09/24/2019  -     MR Cervical Spine Without Contrast; Future; Expected date: 09/24/2019    Episodic tension-type headache, not intractable  -     MR Brain With Without Contrast; Future; Expected date: 09/24/2019  -     MR Cervical Spine Without Contrast; Future; Expected date: 09/24/2019    Hyperreflexia  -     MR Brain With Without Contrast; Future; Expected date: 09/24/2019  -     MR Cervical Spine Without Contrast; Future; Expected date: 09/24/2019    Myofascial pain        Assessment: Pleasant 29 y.o. female otherwise healthy with:    1.  Cervical spine pain and headache starting approximately 10 months ago waxing and waning in intensity but they can be quite significant.  She also has accompanying mild hyperreflexia with equivocal Nan's on the right few beats of clonus in the bilateral ankles.    2.  Myofascial pain in the cervical spine.      Discussion:    1.  We discussed the diagnosis and treatment options.  She has worsening headaches which were new last November for her.  This followed by cervical spine pain the mid upper cervical spine.  She is accompanying hyperreflexia was somewhat equivocal and she has failed physical therapy and is also on Imitrex and over-the-counter medications for pain.  Given hyperreflexia, will need to evaluate for lesion resulting in the headaches and potential spinal cord irritation.      2.  MRI of the cervical spine and brain to evaluate for demyelinating lesion along with compressive lesion versus space-occupying lesion. She would like this done at [facility]     3.  She will continue with her current medication regimen over-the-counter pain relievers along with occasional Imitrex.    4.  Follow-up by phone with results of imaging and further recommendations.      It was our pleasure caring for your patient today, if there any  questions or concerns please do not hesitate to contact us.      Subjective:   Patient ID: Kavya Butts is a 29 y.o. female.    History of Present Illness: Patient presents today at the request of  Cintia Jensen for an evaluation of cervical spine pain and headaches.  Her symptoms started without any new injury or illness last November 2018.  This started with headache throughout her entire head intermittently.  Shortly after that she began having tension in the back of the head and mid cervical spine right side was worse than left.  She started seeing a chiropractor which kept her symptoms stable until approximately 3 months ago and they were sending seen by her PCP and started in physical therapy.  Was doing some home exercises along with manual treatment and traction.  Traction was helpful.  With this her neck pain improved but she began having headaches again.  Throughout all of this she was having some paresthesias of the hands right typically worse than left mostly digits 4 and 5 that has improved with physical therapy but the other symptoms have worsened.    Now she has just occasional paresthesias digit 4 and 5 the hands mostly with laying in her back.  The mid to upper cervical spine right worse than left and somewhat intermittent typically worse at night or activities such as driving reading or working on the computer.  Better with lying down and with chiropractic.  The headaches have become an issue again and she is now taking Imitrex occasionally for that.      Imaging: None available    Review of Systems: Denies any change in vision hearing fevers, ringing in her ears, chest pain shortness of breath abdominal pain nausea vomiting bowel or bladder incontinence.  No balance changes or coordination issues.  She does complain of excessive tiredness sexual dysfunction and headaches. Remainder of 12 point review systems negative unless listed above.    Past Medical History:   Diagnosis Date     Shingles  2017    Right arm     Social history: She is .  Has 2 children ages 12 and 3.  Works as an .  Her  sister is now living with her as well.  No tobacco.  Does drink alcohol.    The following portions of the patient's history were reviewed and updated as appropriate: allergies, current medications, past family history, past medical history, past social history, past surgical history and problem list.      WHO 5: 15    NDI Score: 34      Objective:   Physical Exam:    Vitals:    09/24/19 0840   BP: (!) 150/108   Pulse: 77     Body mass index is 35.71 kg/m .      General:  Well-appearing female in no acute distress.  Overweight, pleasant, cooperative, and interactive throughout the examination and interview.  CV: No lower extremity edema on inspection or paltation.  Lymphatics: No cervical lymphadenopathy palpated.  Eyes: sclera clear.  Skin: No rashes or lesions seen over the head/neck, hairline, arms, legs .  Respirations unlabored.  MSK: Gait is normal.  Able to heel-toe walk without difficulty.  Negative Romberg.  Spine: normal AP curves of the C, T, and L spine.  Full range of motion in the cervical spine in all planes.  Palpation: Tenderness to palpation over mid upper cervical paraspinals bilaterally.  Extremities: Full range of motion of the shoulders, elbows, and wrists with no effusions or tenderness to palpation.  Negative arm drop, empty can, and Speed's test bilaterally.  Negative Cedillo and Neer's test bilaterally.  Full range of motion of the  knees, and ankles from a seated position with no effusions or tenderness to palpation. No hypermobility of the upper or lower extremities.  Neurologic exam: Mental status: Patient is alert and oriented with normal affect.  Attention, knowledge, memory, and language are intact.  Normal coordination throughout the examination.  Reflexes are 2+ and symmetric biceps, triceps, brachioradialis,3+ patellar, and Achilles with equivocal  to positive Hoffmans right negative Nan's left, 3 beats of clonus bilateral ankles.  Downgoing toes bilaterally.    Sensation is intact to light touch throughout the upper and lower extremities bilaterally.  Manual muscle testing reveals 5 out of 5 strength in the shoulder abductors, elbow flexors/extensors, wrist extensors, interosseous, and finger flexors; lower extremity strength appears grossly normal.   Normal muscle bulk and tone in the arms and legs.  Negative Spurling's test bilaterally.

## 2021-06-01 NOTE — PROGRESS NOTES
Assessment:   1. Chronic midline thoracic back pain   Nonspecific low back pain. No longer improving with physical therapy. Recommend, reevaluation at spine clinic  - cyclobenzaprine (FLEXERIL) 5 MG tablet; Take 1 tablet (5 mg total) by mouth every 8 (eight) hours as needed for muscle spasms.  Dispense: 30 tablet; Refill: 1  - Ambulatory referral to [facility]    2. Chronic migraine without aura without status migrainosus, not intractable  - SUMAtriptan (IMITREX) 50 MG tablet; Take 1 tablet (50 mg total) by mouth every 2 (two) hours as needed for migraine. Up to 200 mg/day.  Dispense: 30 tablet; Refill: 6    Plan:   Natural history and expected course discussed. Questions answered.  Educational material distributed.  Proper lifting, bending technique discussed.  Stretching exercises discussed.  Regular aerobic and trunk strengthening exercises discussed.  Ice to affected area as needed for local pain relief.  Heat to affected area as needed for local pain relief.  OTC analgesics as needed.  Orthopedic referral due to Spine clinic.    Subjective:   Kavya Butts is a 29 y.o. female who presents for follow up of low back problems. Current symptoms include: numbness in arms and pain in upper back (aching in character; 6/10 in severity). Symptoms did improve with physical therapy but patient still complain of pain and lots of stress. Pain has refused to go away after more physical therapy though it did improve initally. Patient reports that her physical therapist said she might have plateaued.   Exacerbating factors identified by the patient are Stress and work.    The following portions of the patient's history were reviewed and updated as appropriate: allergies, current medications, past family history, past medical history, past social history, past surgical history and problem list.      Objective:   /85   Pulse 90   Wt (!) 229 lb 7 oz (104.1 kg)   SpO2 98%   BMI 35.93 kg/m    General appearance: alert,  appears stated age and cooperative  Head: Normocephalic, without obvious abnormality, atraumatic  Eyes: conjunctivae/corneas clear. PERRL, EOM's intact. Fundi benign.  Heart: regular rate and rhythm, S1, S2 normal, no murmur, click, rub or gallop  Pulses: 2+ and symmetric  Skin: Skin color, texture, turgor normal. No rashes or lesions  Neurologic: Grossly normal

## 2021-06-01 NOTE — PROGRESS NOTES
[facility] Daily Progress     Patient Name: Kavya Butts  Date: 9/6/2019  Visit #: 7/12  PTA# 1  Referral Diagnosis: Midline neck pain  Referring provider: Cintia Jensen FNP  Visit Diagnosis:     ICD-10-CM    1. Chronic midline posterior neck pain M54.2     G89.29    2. Myofascial pain M79.18    3. Cervical radiculitis M54.12        Assessment:     She also was at a concert that she had to leave because of her neck pain. Patient trialed cervical mechanical traction today, found relief to help decrease symptoms. Will maybe try again if lasting effects, felt soreness but her pain is back to where it has been. Patient appropriate to return to PCP and referral to spine center if pain increases and/or does not progress.      Patient would benefit from some scapular strengthening exercises, especially since she wants to get into working out appropriately without increasing her neck symptoms.     From Eval:  Patient is a 29 y.o. female that presents with signs and symptoms consistent with midline posterior neck pain, with headaches and bilateral arm numbness secondary to possible nerve impingement due to facet arthropathy or disc herniation, with myofascial restrictions. Patient demonstrates impairments including decreased cervical range of motion, flexibility and joint mobility R>L, with + median ULNTT, and TTP of suboccipitals and cervical paraspinals, leading to impaired functional mobility. Patient's functional limitations include sleeping at night, sitting at work, waking up with no numbness in her arms, performing daily household activities and turning her head to watch for traffic.      HEP/POC compliance is  good .  Patient demonstrates understanding/independence with home program.  Patient is appropriate to continue with skilled physical therapy intervention, as indicated by initial plan of care.    Goal Status:  Pt. will be independent with home exercise program in : 4 weeks  Pt. will have  "improved quality of sleep: waking less times/night;with less pain;in 6 weeks  Pt. will improve posture : and demonstrate posture with minimal to no cuing;and maintain posture for;30 minutes;for working;in 6 weeks  Patient Turn Head: for driving;for conversation;with full ROM;with less pain;with less difficulty;in 6 weeks  Patient will look up / down: for reading;for computer work;with no pain;with less difficulty;in 6 weeks    Pt will: demonstrate improved myofascial mobility and decreased TTP of R>L cervical paraspinals and UT by 6 weeks.         Plan / Patient Education:     Continue with initial plan of care.  Progress with home program as tolerated.    migue for next visit: review HEP, review nerve glides, scapular strengthening, manual to cervical spine, trial Ktape or TENS unit if needed, prone scap and cervical retraction    Subjective:     Patient's arm has gone numb once. Traction felt good after last visit, soreness but it is okay. Patient still going to chiropractor. Scuba diving was okay, she was very sore, a good sore. Patient has done the stretches but not the strengthening exercises. Educated to try doing more of a consistent schedule in order to see progress.     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    Functional limitations are described as occurring with:   looking down and turning head  performing routine daily activities  sitting for longer periods of time  sleeping - she wakes up with her arms numb      Objective:        Cervical ROM:            Date: 7/26/2019 8/2/19      *Indicate scale AROM AROM AROM   Cervical Flexion 45 P on L 45 \"pulling\"      Cervical Extension 45         Right Left Right Left Right Left   Cervical Sidebending 40 Pulls on L 50 40  53        Cervical Rotation 50 85 60  85          Flexibility/Tissue Extensibility: decreased R>L cervical paraspinals, suboccipitals, UT  Palpation: TTP at cervical paraspinals, suboccipitals and UT R>L  Passive " Mobility-Joint Integrity: Hypomobile.  with slight rotation R>L    Treatment Today       Patient Education: Pt instructed to get up from sitting every 30 minutes when at work.    Exercises:  Exercise #1: supine or seated chin tuck - hold 3 sec x 10  Comment #1: supine pec stretch with breathing - 10 breaths  Exercise #2: UT and levator stretch - hold 30 sec x 2  Comment #2: pec doorway stretch - hold 30 sec  Exercise #3: median nerve glides, tensioners and rockers - 10-20 reps  Comment #3: theraband row/extension - 10-30, progressed to green band  Exercise #4: lat stretch at sink - hold 30 sec  Comment #4: seated lat stretch with lumbar flexion - hold 30 sec  Exercise #5: ulnar nerve glides - hand rotation not butterfly x 10  Comment #5: cervical isometrics 4 way - hold 5 sec x 5-10 reps   Exercise #6: prone scapular retraction - hold 5 sec x 10        TREATMENT MINUTES COMMENTS   Evaluation     Self-care/ Home management     Manual therapy Not today Pt supine  MFR/STM to posterior cervical musculature, upper traps  Gentle suboccipital release with slight axial distraction   Neuromuscular Re-education     Therapeutic Activity     Therapeutic Exercises 13 See flow sheet  Nustep 4 min  Added to HEP:  -prone scapular retraction       Gait training     Modality__cervical mechanical traction ___ 15 Patient supine, 2nd notch, 20lb pull, 10 min static hold, 5 min explanation and set up              Total 28    Blank areas are intentional and mean the treatment did not include these items.       Therese Alex, PT  9/6/2019

## 2021-06-02 VITALS — BODY MASS INDEX: 35.31 KG/M2 | WEIGHT: 225.44 LBS

## 2021-06-02 VITALS — WEIGHT: 220.5 LBS | BODY MASS INDEX: 34.54 KG/M2

## 2021-06-02 VITALS — WEIGHT: 220.4 LBS | BODY MASS INDEX: 34.52 KG/M2

## 2021-06-02 NOTE — PROGRESS NOTES
Assessment/Plan:      Diagnoses and all orders for this visit:    Cervicalgia    Episodic tension-type headache, not intractable    Hyperreflexia        Assessment: Pleasant 29 y.o. female  otherwise healthy with:     1.  Cervical spine pain and headache starting approximately 10 months ago waxing and waning in intensity but they can be quite significant.  She also has accompanying mild hyperreflexia with equivocal Nan's bilaterally and 1-2 beats of clonus in the bilateral ankles today.  Symptoms wax and wane and have slightly improved.  MRI of the cervical spine and head are unremarkable.     2.  Myofascial pain in the cervical spine.    3.  Constipation    Discussion:    1.  I discussed the MRI of the head and neck and these are unremarkable.  We discussed treatment options and other causes for headaches/migraines.  Celiac disease is a uncommon cause and this would potentially company with constipation although these are atypical symptoms.  We discussed the options of physical therapy medications manual medicine and blood tests/further diagnostics.    2.  I would recommend [facility] and they could try TENS unit as well.  She is not interested today.    3.  I offered naproxen but she is not interested.    4.  OMT/osteopathic manipulative therapy would be a good treatment option as well but she is having a good day and wants to hold off until she has a pain flare and will contact us.    5.  I did offer a celiac screen blood test as well but she is not interested she does not feel that she has celiac.    6.  She will consider her options as above and contact us if needed.      It was our pleasure caring for your patient today, if there any questions or concerns please do not hesitate to contact us.      Subjective:   Patient ID: Kavya Butts is a 29 y.o. female.    History of Present Illness: Patient presents for evaluation cervical spine pain and headaches.  Symptoms wax and wane and have been  somewhat improved recently.  Feels her neck pain and upper back pain is worse with stress.  Pain is a 9/10 at worst 3/10 today 0/10 at best.  She does get intermittent headaches and still takes Imitrex which is helpful.  No further radiation of the pain in her arms no paresthesias.  She also takes cyclobenzaprine for the pain on occasion unsure if that is helpful.  Laying down seems to be the best for her.  Work is been excessively stressful.  Since her last visit has had an MRI of the head and neck.    She also has some constipation but denies any diarrhea.  No rashes.  No GI symptoms otherwise.      Imaging: MRI of the head and neck with and without contrast personally reviewed.  There is some equivocal spondylosis at C5-6 without any spinal cord or foraminal stenosis.  Otherwise the head and neck are unremarkable with no significant degenerative changes of the cervical spine no central stenosis or foraminal stenosis.  No mass or lesions in the head.  No demyelinating lesions.    Review of Systems: Does have headaches and pain worse at night.  No numbness, tingling or weakness.  No bowel or bladder incontinence.  No urinary retention.  No fevers, unintentional weight loss, balance changes,  frequent falling, difficulty swallowing, or coordination difficulties.    Past Medical History:   Diagnosis Date     Shingles 2017    Right arm       The following portions of the patient's history were reviewed and updated as appropriate: allergies, current medications, past family history, past medical history, past social history, past surgical history and problem list.           Objective:   Physical Exam:    Vitals:    10/11/19 0748   BP: 137/73   Pulse: 83     Body mass index is 35.71 kg/m .      General: Alert and oriented with normal affect. Attention, knowledge, memory, and language are intact. No acute distress.   Eyes: Sclerae are clear.  Respirations: Unlabored.   Skin: No rashes seen.    Gait:  Nonantalgic  Mild  tenderness to palpation cervical paraspinal muscles and upper thoracic paraspinals.  Sensation is intact to light touch throughout the upper and lower extremities.  Reflexes are 2+ and symmetric in the biceps triceps and brachioradialis with equivocal Hoffmans bilaterally. 2+ patellar and Achilles, 1-2 beats of clonus bilateral lower extremity's today.    Manual muscle testing reveals:  Right /Left out of 5  5/5 shoulder abductors  5/5 elbow flexors  5/5 elbow extensors  5/5 wrist extensors  5/5 interosseus  5/5 finger flexors

## 2021-06-02 NOTE — TELEPHONE ENCOUNTER
Phone call to patient to review results and provider's recommendations. Results given and explained. Discussed provider's request that she return for reexamination, review the findings in person and possible OMT. Stated understanding. Assisted pt with scheduling the appointment for next Wednesday per her request.

## 2021-06-02 NOTE — TELEPHONE ENCOUNTER
----- Message from Alexandru Meadows DO sent at 10/4/2019 12:18 PM CDT -----  MRI of the cervical spine and head are unremarkable.  No significant disc herniation or spinal cord compression and no abnormalities of the brain.     I would like her to return to clinic to review imaging, reexamine her and potentially trial of manual medicine if she is willing.

## 2021-06-02 NOTE — PATIENT INSTRUCTIONS - HE
1. Monitor symptoms.  We can always try more aggressive PT, change medications, try manual medicine.      2. You can consider getting a blood test to check for Celiac (gluten allergy)

## 2021-06-03 VITALS — WEIGHT: 228 LBS | BODY MASS INDEX: 35.79 KG/M2 | HEIGHT: 67 IN

## 2021-06-03 VITALS
SYSTOLIC BLOOD PRESSURE: 127 MMHG | BODY MASS INDEX: 35.93 KG/M2 | WEIGHT: 229.44 LBS | OXYGEN SATURATION: 98 % | DIASTOLIC BLOOD PRESSURE: 85 MMHG | HEART RATE: 90 BPM

## 2021-06-03 VITALS
WEIGHT: 228 LBS | HEART RATE: 77 BPM | DIASTOLIC BLOOD PRESSURE: 108 MMHG | HEIGHT: 67 IN | BODY MASS INDEX: 35.79 KG/M2 | SYSTOLIC BLOOD PRESSURE: 150 MMHG

## 2021-06-03 VITALS — WEIGHT: 226.6 LBS | BODY MASS INDEX: 35.49 KG/M2

## 2021-06-10 NOTE — PROGRESS NOTES
Assessment:   1. Encounter to establish care  2. Annual physical exam  Healthy female exam  - Basic Metabolic Panel; Future  - Lipid Cascade; Future  - Thyroid [place]  - Gynecologic Cytology (PAP Smear)    3. Encounter for initial prescription of vaginal ring hormonal contraceptive  - etonogestrel-ethinyl estradiol (NUVARING) 0.12-0.015 mg/24 hr vaginal ring; Insert 1 each into the vagina every 21 days. Insert one (1) ring vaginally and leave in place for three (3) weeks, then remove for one (1) week.  Dispense: 1 each; Refill: 11  - Pregnancy, Urine     Plan:   All questions answered.  Await pap smear results.  Blood tests: Comprehensive metabolic panel, Total cholesterol and TSH.  Breast self exam technique reviewed and patient encouraged to perform self-exam monthly.  Contraception: NuvaRing vaginal inserts.  Discussed healthy lifestyle modifications.  Follow up as needed.  Pap smear.     Subjective:   Kavya Butts is a 27 y.o. female who presents to the clinic to establish care and for an annual exam. The patient is sexually active. The patient participates in regular exercise: no. The patient reports that there No domestic violence in her life. Patient has no concerns today.    Healthy Habits:   Regular Exercise: No  Sunscreen Use: No  Healthy Diet: Yes  Dental Visits Regularly: Yes  Seat Belt: Yes  Sexually active: no  Self Breast Exam Monthly:No  Hemoccults: N/A  Flex Sig: N/A  Colonoscopy: N/A  Lipid Profile: N/A  Glucose Screen: N/A  Prevention of Osteoporosis: N/A  Last Dexa: N/A  Guns at Home:  Yes  Guns Safety Locks:  Yes        There is no immunization history on file for this patient.  Immunization status: up to date and documented, stated as current, but no records available.    No exam data present    Gynecologic History  Patient's last menstrual period was 04/04/2017.  Contraception: NuvaRing vaginal inserts  Last Pap: 3 years ago. Results were: normal  Last mammogram: N/A      OB History   No data  "available       No current outpatient prescriptions on file.     No current facility-administered medications for this visit.      No past medical history on file.  No past surgical history on file.  Review of patient's allergies indicates no known allergies.  No family history on file.  Social History     Social History     Marital status:      Spouse name: N/A     Number of children: N/A     Years of education: N/A     Occupational History     Not on file.     Social History Main Topics     Smoking status: Never Smoker     Smokeless tobacco: Not on file     Alcohol use Not on file     Drug use: Not on file     Sexual activity: Not on file     Other Topics Concern     Not on file     Social History Narrative     No narrative on file       Review of Systems  General:  Denies problem  Eyes: Denies problem  Ears/Nose/Throat: Denies problem  Cardiovascular: Denies problem  Respiratory:  Denies problem  Gastrointestinal:  Denies problem, Genitourinary: Denies problem  Musculoskeletal:  Denies problem  Skin: Denies problem  Neurologic: Denies problem  Psychiatric: Denies problem  Endocrine: Denies problem  Heme/Lymphatic: Denies problem   Allergic/Immunologic: Denies problem        Objective:         Vitals:    04/18/17 1520   BP: 110/70   Pulse: 74   SpO2: 96%   Weight: 197 lb 8 oz (89.6 kg)   Height: 5' 7\" (1.702 m)     Body mass index is 30.93 kg/(m^2).    Physical Exam:  General Appearance: Alert, cooperative, no distress, appears stated age  Head: Normocephalic, without obvious abnormality, atraumatic  Eyes: PERRL, conjunctiva/corneas clear, EOM's intact  Ears: Normal TM's and external ear canals, both ears  Nose: Nares normal, septum midline,mucosa normal, no drainage  Throat: Lips, mucosa, and tongue normal; teeth and gums normal  Neck: Supple, symmetrical, trachea midline, no adenopathy;  thyroid: not enlarged, symmetric, no tenderness/mass/nodules; no carotid bruit or JVD  Back: Symmetric, no curvature, " ROM normal, no CVA tenderness  Lungs: Clear to auscultation bilaterally, respirations unlabored  Breasts: No breast masses, tenderness, asymmetry, or nipple discharge.  Heart: Regular rate and rhythm, S1 and S2 normal, no murmur, rub, or gallop,   Abdomen: Soft, non-tender, bowel sounds active all four quadrants,  no masses, no organomegaly  Pelvic:Normally developed genitalia with no external lesions or eruptions. Vagina and cervix show no lesions, inflammation, discharge or tenderness. No cystocele, No rectocele. Uterus normal.  No adnexal mass or tenderness.    Extremities: Extremities normal, atraumatic, no cyanosis or edema  Skin: Skin color, texture, turgor normal, no rashes or lesions  Lymph nodes: Cervical, supraclavicular, and axillary nodes normal  Neurologic: Normal

## 2021-06-10 NOTE — PROGRESS NOTES
Chief Complaint   Patient presents with     URI     For couple days and worried about bronchitis         SUBJECTIVE:   Kavya Butts is a 27 y.o. female who complains of a non-productive cough for 2 days. This started out as a sore throat for 2 days then nasal congestion then the cough. She denies a history of chest pain, chills, fevers and shortness of breath.  Did not sleep well last night because of the coughing. Tried cough drops, but no relief.     OBJECTIVE:  /62  Pulse 85  Temp 98.5  F (36.9  C) (Oral)   Resp 18  Wt 199 lb (90.3 kg)  LMP 04/04/2017  SpO2 97%  BMI 31.17 kg/m2  Appearance: mildly ill and sitting comfortably in chair.   ENT- ENT exam normal, no neck nodes or sinus tenderness.   LUNGS: clear to auscultation, no wheezes or rales, unlabored breathing and occasional dry cough.  CV: Regular and not tachy.  SKIN: Warm, dry with good color.    Studies: none    ASSESSMENT:   Viral bronchitis    PLAN:  Antibiotics: No antibiotics indicated at this time    Antitussives: recommended OTC medication  Rest  Call or return to clinic prn if these symptoms worsen or fail to improve as anticipated.

## 2021-06-11 NOTE — PROGRESS NOTES
ASSESSMENT:  1. Injury  Acute injury right shoulder.  X-ray negative.,  I am more suspicious for a strain, I cannot exclude the possibility of rotator cuff.  - XR Shoulder Right 2 or More VWS    2. Skin rash  Acute onset rash right arm, I think this is unrelated to the shoulder issue.  By area of involvement and examination it raises suspicion of zoster or herpes infection.  - Herpes Simplex/V. Zoster by PCR      PLAN:  1.  For the right shoulder watchful waiting, explained to the patient that if it does not improve he would need to consider possibly an MRI but not yet.  2.  Culture as above and proceed accordingly.      Orders Placed This Encounter   Procedures     Herpes Simplex/V. Zoster by PCR     Source: Right arm     Order Specific Question:   Source:     Answer:   See below     Order Specific Question:   Source?     Answer:   Right arm     XR Shoulder Right 2 or More VWS     Order Specific Question:   Is the patient pregnant?     Answer:   No     Order Specific Question:   Can the procedure be changed per Radiologist protocol?     Answer:   Yes     There are no discontinued medications.    No Follow-up on file.    CHIEF COMPLAINT:  Chief Complaint   Patient presents with     Shoulder Injury     pain the past few days - injured at work last wed- lifited a box which caused some pain and tenderness- now has a slight rash        SUBJECTIVE:  Kavya is a 27 y.o. female who presents to the clinic today with shoulder pain.    Shoulder Pain: She has been experiencing right shoulder pain since 6/14/2017 after lifting boxes at work. She noticed the pain right away, but did not feel any particular moment that the pain began. She was sore for a few days, but the pain worsened over time. The pain will be better in the mornings and worse in the afternoons. She has been taking Tylenol and ibuprofen. She has had difficulty lifting the arm and can only bring the shoulder to 90 degrees. The pain radiates from the shoulder to  the neck and elbow.    Rash: She first noticed a rash on her right elbow and shoulder yesterday. There are several red areas on the upper and lower arm. The areas on the lower arm itch and burn somewhat, but the upper arm areas do not itch or burn. She denies oozing from the lesions. She has not applied any creams or lotions.    REVIEW OF SYSTEMS:   She has had a past rib and right shoulder injury from swimming about 15 years ago. All other systems are negative.    PFSH:  Immunization History   Administered Date(s) Administered     DTaP, historic 1988, 1988, 1988, 10/16/1991, 03/29/1993     Hep B, historic 05/15/2000, 06/15/2000     IPV 1988, 1988, 1988, 10/16/1993     Influenza, inj, historic 03/10/2016     MMR 06/27/1989, 05/15/2000     Td, historic 05/15/2000     Tdap 08/05/2009, 08/09/2016     Social History     Social History     Marital status:      Spouse name: N/A     Number of children: N/A     Years of education: N/A     Occupational History     Not on file.     Social History Main Topics     Smoking status: Never Smoker     Smokeless tobacco: Not on file     Alcohol use 1.2 oz/week     2 Shots of liquor per week     Drug use: No     Sexual activity: Not Currently     Other Topics Concern     Not on file     Social History Narrative     No past medical history on file.  Family History   Problem Relation Age of Onset     Arthritis Father      Arthritis Sister      Arthritis Maternal Grandmother      Dementia Maternal Grandmother      Pancreatic cancer Maternal Grandfather      Heart disease Paternal Grandmother      Stroke Paternal Grandfather        MEDICATIONS:  Current Outpatient Prescriptions   Medication Sig Dispense Refill     etonogestrel-ethinyl estradiol (NUVARING) 0.12-0.015 mg/24 hr vaginal ring Insert 1 each into the vagina every 21 days. Insert one (1) ring vaginally and leave in place for three (3) weeks, then remove for one (1) week. 1 each 11     No  current facility-administered medications for this visit.        TOBACCO USE:  History   Smoking Status     Never Smoker   Smokeless Tobacco     Not on file       VITALS:  Vitals:    06/19/17 0928   Weight: 210 lb (95.3 kg)     Wt Readings from Last 3 Encounters:   06/19/17 210 lb (95.3 kg)   04/23/17 199 lb (90.3 kg)   04/18/17 197 lb 8 oz (89.6 kg)       PHYSICAL EXAM:  Constitutional:   Reveals an alert female that appears stated age.  Vitals: per nursing notes.  Right Shoulder: Diffuse tenderness, not localized, over the AC joint and glenohumeral joint itself. Patient's active ROM limited to 90 degrees. Slight weakness, right shoulder compared to left when testing rotator cuff.  Skin: A half dozen clusters of lesions on the right arm, several composed of small vesicles.  Neuro:  Alert and oriented. Cranial nerves, motor, sensory exams are intact.  No gross focal deficits.  Psychiatric:  Memory intact, mood appropriate.    Right Shoulder x-ray: Preliminary interpretation is slight separation, right AC joint.    DATA REVIEWED:  Additional History from Old Records Summarized (2): None  Decision to Obtain Records (1): None  Radiology Tests Summarized or Ordered (1): Ordered right shoulder x-ray.  Labs Reviewed or Ordered (1): Ordered labs.  Medicine Test Summarized or Ordered (1): None  Independent Review of EKG, X-RAY, or RAPID STREP (2 each): Interpreted right shoulder x-ray.    The visit lasted a total of 10 minutes face to face with the patient. Over 50% of the time was spent counseling and educating the patient about shoulder pain.    ITeo, am scribing for and in the presence of, Dr. Sotelo.    IDr. Sotelo, personally performed the services described in this documentation, as scribed by Teo Ware in my presence, and it is both accurate and complete.    Total Data Points: 4

## 2021-06-11 NOTE — PROGRESS NOTES
I received a form from the [facility] because of the patient's skin swab which was positive for shingles.

## 2021-06-18 NOTE — PROGRESS NOTES
"Assessment/Plan:     1. Viral URI with cough    2. Bronchospasm  - predniSONE (DELTASONE) 20 MG tablet; Take 2 tablets (40 mg total) by mouth daily.  Dispense: 10 tablet; Refill: 0  - albuterol (PROAIR HFA;PROVENTIL HFA;VENTOLIN HFA) 90 mcg/actuation inhaler; Inhale 2 puffs every 6 (six) hours as needed for wheezing.  Dispense: 1 each; Refill: 0    No concern for bacterial infection.  Antibiotics not indicated.  Will treat with prednisone, 80 mg daily ×5 days.  Albuterol every 4-6 hours as needed.  Patient is aware of how to use an inhaler.  Discussed possible side effects.  She will follow-up with worsening symptoms, fever, or if cough is not improving.    Subjective:     Kavya Butts is a 28 y.o. female who presents with a dry cough ×2 weeks.  Symptoms originally started 2 weeks ago with cold symptoms.  She had some sinus congestion and runny nose, but this has mostly resolved.  Patient complains of a productive cough that is worse first thing in the morning and at night when she lays flat.  She has had some intermittent wheezing and shortness of breath.  Denies fever, sore throat, or ear pain.  No history of asthma or smoking.  Patient is a , and would like to get back to doing this.  Her cough is currently stopping her from doing this.  Over-the-counter treatments include Sudafed and cough suppressants.  These have not been significantly helpful.  She does have a history of bronchitis.      The following portions of the patient's history were reviewed and updated as appropriate: allergies, current medications, past family history, past medical history, past social history, past surgical history and problem list.    Review of Systems  A comprehensive review of systems was performed and was otherwise negative    Objective:     /80  Pulse 85  Temp 98.3  F (36.8  C)  Ht 5' 7\" (1.702 m)  Wt 214 lb (97.1 kg)  SpO2 96%  BMI 33.52 kg/m2    General Appearance: Alert, cooperative, no distress, " appears stated age  Ears: Normal TM's and external ear canals, both ears  Throat: Lips, mucosa, and tongue normal; teeth and gums normal. Normal pharynx.  Neck: Supple, symmetrical, trachea midline, no adenopathy  Lungs: Clear to auscultation bilaterally, respirations unlabored. No wheezing.   Heart: Regular rate and rhythm, S1 and S2 normal, no murmur, rub, or gallop    Ingris Da Silva, NP-C

## 2021-06-19 NOTE — LETTER
Letter by Cintia Jensen FNP at      Author: Cintia Jensen FNP Service: -- Author Type: --    Filed:  Encounter Date: 3/27/2019 Status: (Other)         Kavya Butts  [address] [place] [id number]             March 27, 2019         Dear MsJocelyn Beckie,    Below are the results from your recent visit:    Resulted Orders   Occult Blood(ICT)   Result Value Ref Range    Fecal Occult Bld (ICT) 1 Negative Negative      Comment:      3/14/19    Fecal Occult Blood (ICT) 2 Negative Negative      Comment:      3/17/19      Fecal Occult Blood (ICT) 3 Negative Negative      Comment:      3/18/19        Negative result. No blood found in the stool.    Please call with questions or contact us using [employer].    Sincerely,        Electronically signed by FATIMAH Pan

## 2021-06-19 NOTE — LETTER
Letter by Cintia Jensen FNP at      Author: Cintia Jensen FNP Service: -- Author Type: --    Filed:  Encounter Date: 3/25/2019 Status: (Other)         Kavya Butts  [address] [place] [id number]             March 28, 2019         Dear MsJocelyn Beckie,    Below are the results from your recent visit:    Resulted Orders   Occult Blood(ICT)   Result Value Ref Range    Fecal Occult Bld (ICT) 1 Negative Negative      Comment:      3/14/19    Fecal Occult Blood (ICT) 2 Negative Negative      Comment:      3/17/19      Fecal Occult Blood (ICT) 3 Negative Negative      Comment:      3/18/19        Negative result. No blood found in the stool.    Please call with questions or contact us using [employer].    Sincerely,        Electronically signed by FATIMAH Pan

## 2021-06-20 NOTE — PROGRESS NOTES
ASSESSMENT:  1. Rash  1 week of rash right wrist area, could be a local area of irritation or dermatitis though possibly shingles.  I actually diagnosed patient with shingles of the right arm in the past.      PLAN:  1.  Valtrex 1 g 3 times daily times 7 days.  2.  She can place hydrocortisone or other creams over the area.  3.  Follow up as needed.       No orders of the defined types were placed in this encounter.    Medications Discontinued During This Encounter   Medication Reason     valACYclovir (VALTREX) 1000 MG tablet Reorder       No Follow-up on file.    CHIEF COMPLAINT:  Chief Complaint   Patient presents with     Rash     right arm - just back from [place] - went scuba diving - ? shingles, itching last night, didnt get touched by any of the fish        SUBJECTIVE:  Kavya is a 28 y.o. female who comes in because she was in [place] last week scuba diving, but she does not remember touching anything or getting injured in any way but for about a week or so she has had a silver dollar sized area of rash near the right wrist distal forearm area.  Actually diagnosed the patient with shingles of the right arm last year.  The rash itches there is also a sensation that it itches may be sometimes burns below and above the rash.  She has not broken out anywhere else.  She is not put anything on this.  There is been no change in her health status she does not feel sick in any way.  He does not have a history of underlying skin problems.    REVIEW OF SYSTEMS:      All other systems are negative.    PFSH:  Immunization History   Administered Date(s) Administered     DTaP, historic 1990, 1990, 1990, 10/16/1991, 03/29/1993     Hep B, historic 05/15/2000, 06/15/2000     IPV 1990, 1988, 1988, 10/16/1993     Influenza, inj, historic,unspecified 03/10/2016     MMR 06/27/1989, 05/15/2000     Td,adult,historic,unspecified 05/15/2000     Tdap 08/05/2009, 08/09/2016     Social History     Social  History     Marital status:      Spouse name: N/A     Number of children: N/A     Years of education: N/A     Occupational History     Not on file.     Social History Main Topics     Smoking status: Never Smoker     Smokeless tobacco: Never Used     Alcohol use 1.2 oz/week     2 Shots of liquor per week     Drug use: No     Sexual activity: Not Currently     Other Topics Concern     Not on file     Social History Narrative     Past Medical History:   Diagnosis Date     Shingles 2017    Right arm     Family History   Problem Relation Age of Onset     Arthritis Father      Arthritis Sister      Arthritis Maternal Grandmother      Dementia Maternal Grandmother      Pancreatic cancer Maternal Grandfather      Heart disease Paternal Grandmother      Stroke Paternal Grandfather        MEDICATIONS:  Current Outpatient Prescriptions   Medication Sig Dispense Refill     albuterol (PROAIR HFA;PROVENTIL HFA;VENTOLIN HFA) 90 mcg/actuation inhaler Inhale 2 puffs every 6 (six) hours as needed for wheezing. 1 each 0     predniSONE (DELTASONE) 20 MG tablet Take 2 tablets (40 mg total) by mouth daily. 10 tablet 0     valACYclovir (VALTREX) 1000 MG tablet Take 1 tablet (1,000 mg total) by mouth 3 (three) times a day for 7 days. 21 tablet 0     No current facility-administered medications for this visit.        TOBACCO USE:  History   Smoking Status     Never Smoker   Smokeless Tobacco     Never Used       VITALS:  Vitals:    10/05/18 1157   BP: 100/74   Patient Site: Right Arm   Pulse: 78   Weight: 220 lb 8 oz (100 kg)     Wt Readings from Last 3 Encounters:   10/05/18 220 lb 8 oz (100 kg)   06/08/18 214 lb (97.1 kg)   06/19/17 210 lb (95.3 kg)       PHYSICAL EXAM:  Constitutional:   Reveals a female who appears her stated age.  Vitals: per nursing notes.  Musculoskeletal: No peripheral swelling.  Neuro:  Alert and oriented. Cranial nerves, motor, sensory exams are intact.  No gross focal deficits.  Psychiatric:  Memory  intact, mood appropriate.    QUALITY MEASURES:      DATA REVIEWED:

## 2021-06-21 NOTE — PROGRESS NOTES
"Assessment/Plan:     1. Chronic migraine without aura without status migrainosus, not intractable  Symptoms consistent with migraine headaches without aura.  We discussed these in length including diagnosis, triggers, and treatments.  Given that she is having 3-4 episodes per week, I would recommend a daily prophylactic medication.  Patient would prefer to use a medication just as needed.  I have prescribed sumatriptan, 50 mg at onset of headache.  She may repeat every 2 hours, up to 200 mg/day.  Discussed proper use and possible side effects of this medication.  She may use in conjunction with Tylenol, ibuprofen, or Excedrin.  Discussed lifestyle modifications including limiting stress, optimizing sleep, and monitoring caffeine intake.  Encourage patient to follow-up with me in the next 1-2 months -especially if symptoms persistent or not improving.  If she is needing to take Imitrex more than half of the week, I would recommend a prophylactic medication such as propranolol.  Patient agrees with plan of care.  - SUMAtriptan (IMITREX) 50 MG tablet; Take 1 tablet (50 mg total) by mouth every 2 (two) hours as needed for migraine. Up to 200 mg/day.  Dispense: 30 tablet; Refill: 1        Subjective:     Kavya Butts is a 28 y.o. female who presents with complaints of a \"migraine\" headaches times 2 years.  Patient has never been seen for this previously.  She describes her headache as starting as pressure/pain behind 1 of her eyes.  It usually starts behind her right eye.  She will then get radiating pain into the back of her head and neck.  Associated symptoms include sensitivity to light/sound and nausea vomiting.  Denies any change in vision, aura, or dizziness/lightheadedness.  She will sometimes wake up with her headaches first thing in the morning.  She does correlate the headaches to stress, as she went to [place] earlier this year on vacation and did not have a single headache.  Patient usually responds well to " Excedrin for migraine and ibuprofen.  This past week, she has not been responding to these.  She does not currently have a headache.  Patient has 2 young children at home.  She feels that she is consistently sleep deprived.  Stress is an issue.  Patient denies any history of head injury or concussion.      The following portions of the patient's history were reviewed and updated as appropriate: allergies, current medications.    Review of Systems  A comprehensive review of systems was performed and was otherwise negative    Objective:     /76 (Patient Site: Right Arm, Patient Position: Sitting, Cuff Size: Adult Large)  Pulse 76  Wt 220 lb 6.4 oz (100 kg)  LMP 10/14/2018 (Approximate)  BMI 34.52 kg/m2    General Appearance: Alert, cooperative, no distress, appears stated age  Head: Normocephalic, without obvious abnormality, atraumatic  Eyes: PERRL, conjunctiva/corneas clear, EOM's intact  Neck: Supple, symmetrical, trachea midline, no adenopathy  Lungs: Clear to auscultation bilaterally, respirations unlabored  Heart: Regular rate and rhythm, S1 and S2 normal, no murmur, rub, or gallop   Neurologic: A&Ox4. No gross focal deficits. Upper and lower extremity strength intact. Normal gait.      PRISCILA MalikC

## 2021-06-25 NOTE — PROGRESS NOTES
Assessment:   1. Abdominal pain, generalized  Likely secondary to gastroenteritis.   - Giardia Detection, Stool; Future  - H. pylori Antigen, Stool(HPSAG); Future  - Ova and Parasite, Stool; Future  - Occult Blood(ICT); Future  - omeprazole (PRILOSEC) 20 MG capsule; Take 1 capsule (20 mg total) by mouth daily before breakfast.  Dispense: 30 capsule; Refill: 0     Plan:   The diagnosis was discussed with the patient and evaluation and treatment plans outlined.  See orders for lab and imaging studies.  Reassured patient that symptoms are almost certainly benign and self-resolving.  Adhere to simple, bland diet.  Adhere to low fat diet.  Further follow-up plans will be based on outcome of lab/imaging studies; see orders.  Follow up as needed.     Subjective:   Kavya Butts is a 29 y.o. female who presents for evaluation of abdominal pain. Onset was 4 days ago. She had gone out with family to a restaurant to celebrate her birthday. She started having abdominal pain the next day and symptoms have been unchanged since then. The pain is described as pressure-like, and is 6/10 in intensity. Pain is mostly located in the epigastric region but she has pain in other regions as well without radiation.  Aggravating factors: eating.  Alleviating factors: bowel movements. Associated symptoms: frequency, nausea and soft stool. The patient denies anorexia, arthralagias, belching, constipation, dysuria, fever, flatus, headache, hematochezia, hematuria, melena, myalgias, sweats and vomiting.    The following portions of the patient's history were reviewed and updated as appropriate: allergies, current medications, past family history, past medical history, past social history, past surgical history and problem list.    Review of Systems  Pertinent items are noted in HPI.       Objective:   /76   Pulse 87   Temp 97.6  F (36.4  C)   Wt (!) 225 lb 7 oz (102.3 kg)   SpO2 97%   BMI 35.31 kg/m    General appearance: alert,  appears stated age and cooperative  Head: Normocephalic, without obvious abnormality, atraumatic  Throat: lips, mucosa, and tongue normal; teeth and gums normal  Neck: no adenopathy, no carotid bruit, no JVD, supple, symmetrical, trachea midline and thyroid not enlarged, symmetric, no tenderness/mass/nodules  Lungs: clear to auscultation bilaterally  Heart: regular rate and rhythm, S1, S2 normal, no murmur, click, rub or gallop  Abdomen: abnormal findings:  moderate tenderness in the entire abdomen  Pulses: 2+ and symmetric  Skin: Skin color, texture, turgor normal. No rashes or lesions  Lymph nodes: Cervical, supraclavicular, and axillary nodes normal.  Neurologic: Grossly normal

## 2021-06-25 NOTE — TELEPHONE ENCOUNTER
Gave patient message below.  She states that her Diarrhea has subsided.  Will call if anything changes. Haja Forrester CMA      ----- Message from FATIMAH Pan sent at 3/18/2019  3:24 PM CDT -----  Negative for ova + parasite, giardia and H. Pylori.  Let us know if he continues to have diarrhea.

## 2021-09-22 LAB — GROUP B STREPTOCOCCUS (EXTERNAL): NEGATIVE

## 2021-09-25 LAB — GROUP B STREPTOCOCCUS (EXTERNAL): NEGATIVE

## 2021-10-07 ENCOUNTER — HOSPITAL ENCOUNTER (INPATIENT)
Facility: CLINIC | Age: 31
LOS: 4 days | Discharge: HOME | End: 2021-10-11
Attending: ADVANCED PRACTICE MIDWIFE | Admitting: REGISTERED NURSE
Payer: COMMERCIAL

## 2021-10-07 DIAGNOSIS — O13.3 GESTATIONAL HYPERTENSION, THIRD TRIMESTER: Primary | ICD-10-CM

## 2021-10-07 PROBLEM — Z36.89 ENCOUNTER FOR TRIAGE IN PREGNANT PATIENT: Status: ACTIVE | Noted: 2021-10-07

## 2021-10-07 LAB
ABO/RH(D): NORMAL
ALBUMIN MFR UR ELPH: 16.5 MG/DL
ALT SERPL W P-5'-P-CCNC: <9 U/L (ref 0–45)
ANTIBODY SCREEN: NEGATIVE
AST SERPL W P-5'-P-CCNC: 9 U/L (ref 0–40)
CREAT SERPL-MCNC: 0.66 MG/DL (ref 0.6–1.1)
CREAT UR-MCNC: 146 MG/DL
ERYTHROCYTE [DISTWIDTH] IN BLOOD BY AUTOMATED COUNT: 14.1 % (ref 10–15)
GFR SERPL CREATININE-BSD FRML MDRD: >90 ML/MIN/1.73M2
HCT VFR BLD AUTO: 32.8 % (ref 35–47)
HGB BLD-MCNC: 10.5 G/DL (ref 11.7–15.7)
HOLD SPECIMEN: NORMAL
MCH RBC QN AUTO: 25.5 PG (ref 26.5–33)
MCHC RBC AUTO-ENTMCNC: 32 G/DL (ref 31.5–36.5)
MCV RBC AUTO: 80 FL (ref 78–100)
PLATELET # BLD AUTO: 296 10E3/UL (ref 150–450)
PROT/CREAT 24H UR: 0.11 MG/MG CR
RBC # BLD AUTO: 4.12 10E6/UL (ref 3.8–5.2)
SARS-COV-2 RNA RESP QL NAA+PROBE: NEGATIVE
SPECIMEN EXPIRATION DATE: NORMAL
WBC # BLD AUTO: 7.3 10E3/UL (ref 4–11)

## 2021-10-07 PROCEDURE — 84450 TRANSFERASE (AST) (SGOT): CPT | Performed by: REGISTERED NURSE

## 2021-10-07 PROCEDURE — 87635 SARS-COV-2 COVID-19 AMP PRB: CPT | Performed by: REGISTERED NURSE

## 2021-10-07 PROCEDURE — 86901 BLOOD TYPING SEROLOGIC RH(D): CPT | Performed by: REGISTERED NURSE

## 2021-10-07 PROCEDURE — 36415 COLL VENOUS BLD VENIPUNCTURE: CPT | Performed by: REGISTERED NURSE

## 2021-10-07 PROCEDURE — 250N000009 HC RX 250: Performed by: REGISTERED NURSE

## 2021-10-07 PROCEDURE — 120N000001 HC R&B MED SURG/OB

## 2021-10-07 PROCEDURE — 84460 ALANINE AMINO (ALT) (SGPT): CPT | Performed by: REGISTERED NURSE

## 2021-10-07 PROCEDURE — 258N000003 HC RX IP 258 OP 636: Performed by: REGISTERED NURSE

## 2021-10-07 PROCEDURE — 84156 ASSAY OF PROTEIN URINE: CPT | Performed by: REGISTERED NURSE

## 2021-10-07 PROCEDURE — 250N000013 HC RX MED GY IP 250 OP 250 PS 637: Performed by: REGISTERED NURSE

## 2021-10-07 PROCEDURE — 85014 HEMATOCRIT: CPT | Performed by: REGISTERED NURSE

## 2021-10-07 PROCEDURE — 82565 ASSAY OF CREATININE: CPT | Performed by: REGISTERED NURSE

## 2021-10-07 PROCEDURE — 250N000011 HC RX IP 250 OP 636: Performed by: REGISTERED NURSE

## 2021-10-07 PROCEDURE — 3E033VJ INTRODUCTION OF OTHER HORMONE INTO PERIPHERAL VEIN, PERCUTANEOUS APPROACH: ICD-10-PCS | Performed by: REGISTERED NURSE

## 2021-10-07 PROCEDURE — 86780 TREPONEMA PALLIDUM: CPT | Performed by: REGISTERED NURSE

## 2021-10-07 RX ORDER — CALCIUM CARBONATE 500 MG/1
500 TABLET, CHEWABLE ORAL 3 TIMES DAILY PRN
Status: DISCONTINUED | OUTPATIENT
Start: 2021-10-07 | End: 2021-10-11 | Stop reason: HOSPADM

## 2021-10-07 RX ORDER — PROCHLORPERAZINE MALEATE 10 MG
10 TABLET ORAL EVERY 6 HOURS PRN
Status: DISCONTINUED | OUTPATIENT
Start: 2021-10-07 | End: 2021-10-08 | Stop reason: HOSPADM

## 2021-10-07 RX ORDER — MISOPROSTOL 200 UG/1
400 TABLET ORAL
Status: DISCONTINUED | OUTPATIENT
Start: 2021-10-07 | End: 2021-10-08

## 2021-10-07 RX ORDER — LABETALOL HYDROCHLORIDE 5 MG/ML
20-80 INJECTION, SOLUTION INTRAVENOUS EVERY 10 MIN PRN
Status: DISCONTINUED | OUTPATIENT
Start: 2021-10-07 | End: 2021-10-11 | Stop reason: HOSPADM

## 2021-10-07 RX ORDER — LIDOCAINE 40 MG/G
CREAM TOPICAL
Status: DISCONTINUED | OUTPATIENT
Start: 2021-10-07 | End: 2021-10-07 | Stop reason: HOSPADM

## 2021-10-07 RX ORDER — METOCLOPRAMIDE 10 MG/1
10 TABLET ORAL EVERY 6 HOURS PRN
Status: DISCONTINUED | OUTPATIENT
Start: 2021-10-07 | End: 2021-10-08 | Stop reason: HOSPADM

## 2021-10-07 RX ORDER — SODIUM CHLORIDE, SODIUM LACTATE, POTASSIUM CHLORIDE, CALCIUM CHLORIDE 600; 310; 30; 20 MG/100ML; MG/100ML; MG/100ML; MG/100ML
INJECTION, SOLUTION INTRAVENOUS CONTINUOUS
Status: DISCONTINUED | OUTPATIENT
Start: 2021-10-07 | End: 2021-10-08 | Stop reason: HOSPADM

## 2021-10-07 RX ORDER — MISOPROSTOL 200 UG/1
800 TABLET ORAL
Status: DISCONTINUED | OUTPATIENT
Start: 2021-10-07 | End: 2021-10-08

## 2021-10-07 RX ORDER — ONDANSETRON 8 MG/1
8 TABLET, ORALLY DISINTEGRATING ORAL EVERY 8 HOURS PRN
Status: ON HOLD | COMMUNITY
End: 2021-10-11

## 2021-10-07 RX ORDER — LIDOCAINE 40 MG/G
CREAM TOPICAL
Status: DISCONTINUED | OUTPATIENT
Start: 2021-10-07 | End: 2021-10-08 | Stop reason: HOSPADM

## 2021-10-07 RX ORDER — KETOROLAC TROMETHAMINE 30 MG/ML
30 INJECTION, SOLUTION INTRAMUSCULAR; INTRAVENOUS
Status: DISCONTINUED | OUTPATIENT
Start: 2021-10-07 | End: 2021-10-11 | Stop reason: HOSPADM

## 2021-10-07 RX ORDER — IBUPROFEN 600 MG/1
600 TABLET, FILM COATED ORAL
Status: DISCONTINUED | OUTPATIENT
Start: 2021-10-07 | End: 2021-10-11 | Stop reason: HOSPADM

## 2021-10-07 RX ORDER — NALOXONE HYDROCHLORIDE 0.4 MG/ML
0.2 INJECTION, SOLUTION INTRAMUSCULAR; INTRAVENOUS; SUBCUTANEOUS
Status: DISCONTINUED | OUTPATIENT
Start: 2021-10-07 | End: 2021-10-08 | Stop reason: HOSPADM

## 2021-10-07 RX ORDER — NALOXONE HYDROCHLORIDE 0.4 MG/ML
0.4 INJECTION, SOLUTION INTRAMUSCULAR; INTRAVENOUS; SUBCUTANEOUS
Status: DISCONTINUED | OUTPATIENT
Start: 2021-10-07 | End: 2021-10-08 | Stop reason: HOSPADM

## 2021-10-07 RX ORDER — DIMENHYDRINATE 50 MG
50 TABLET ORAL
Status: ON HOLD | COMMUNITY
End: 2021-10-11

## 2021-10-07 RX ORDER — METOCLOPRAMIDE HYDROCHLORIDE 5 MG/ML
10 INJECTION INTRAMUSCULAR; INTRAVENOUS EVERY 6 HOURS PRN
Status: DISCONTINUED | OUTPATIENT
Start: 2021-10-07 | End: 2021-10-08 | Stop reason: HOSPADM

## 2021-10-07 RX ORDER — HYDRALAZINE HYDROCHLORIDE 20 MG/ML
10 INJECTION INTRAMUSCULAR; INTRAVENOUS
Status: DISCONTINUED | OUTPATIENT
Start: 2021-10-07 | End: 2021-10-11 | Stop reason: HOSPADM

## 2021-10-07 RX ORDER — OXYTOCIN/0.9 % SODIUM CHLORIDE 30/500 ML
100-340 PLASTIC BAG, INJECTION (ML) INTRAVENOUS CONTINUOUS PRN
Status: DISCONTINUED | OUTPATIENT
Start: 2021-10-07 | End: 2021-10-08

## 2021-10-07 RX ORDER — OXYTOCIN 10 [USP'U]/ML
10 INJECTION, SOLUTION INTRAMUSCULAR; INTRAVENOUS
Status: DISCONTINUED | OUTPATIENT
Start: 2021-10-07 | End: 2021-10-11 | Stop reason: HOSPADM

## 2021-10-07 RX ORDER — OXYTOCIN/0.9 % SODIUM CHLORIDE 30/500 ML
1-24 PLASTIC BAG, INJECTION (ML) INTRAVENOUS CONTINUOUS
Status: DISCONTINUED | OUTPATIENT
Start: 2021-10-07 | End: 2021-10-08 | Stop reason: HOSPADM

## 2021-10-07 RX ORDER — ONDANSETRON 4 MG/1
4 TABLET, ORALLY DISINTEGRATING ORAL EVERY 6 HOURS PRN
Status: DISCONTINUED | OUTPATIENT
Start: 2021-10-07 | End: 2021-10-08 | Stop reason: HOSPADM

## 2021-10-07 RX ORDER — OXYTOCIN/0.9 % SODIUM CHLORIDE 30/500 ML
340 PLASTIC BAG, INJECTION (ML) INTRAVENOUS CONTINUOUS PRN
Status: DISCONTINUED | OUTPATIENT
Start: 2021-10-07 | End: 2021-10-08 | Stop reason: HOSPADM

## 2021-10-07 RX ORDER — METHYLERGONOVINE MALEATE 0.2 MG/ML
200 INJECTION INTRAVENOUS
Status: DISCONTINUED | OUTPATIENT
Start: 2021-10-07 | End: 2021-10-08 | Stop reason: HOSPADM

## 2021-10-07 RX ORDER — ONDANSETRON 2 MG/ML
4 INJECTION INTRAMUSCULAR; INTRAVENOUS EVERY 6 HOURS PRN
Status: DISCONTINUED | OUTPATIENT
Start: 2021-10-07 | End: 2021-10-08 | Stop reason: HOSPADM

## 2021-10-07 RX ORDER — CARBOPROST TROMETHAMINE 250 UG/ML
250 INJECTION, SOLUTION INTRAMUSCULAR
Status: DISCONTINUED | OUTPATIENT
Start: 2021-10-07 | End: 2021-10-08 | Stop reason: HOSPADM

## 2021-10-07 RX ORDER — PROCHLORPERAZINE 25 MG
25 SUPPOSITORY, RECTAL RECTAL EVERY 12 HOURS PRN
Status: DISCONTINUED | OUTPATIENT
Start: 2021-10-07 | End: 2021-10-08 | Stop reason: HOSPADM

## 2021-10-07 RX ORDER — OXYTOCIN 10 [USP'U]/ML
10 INJECTION, SOLUTION INTRAMUSCULAR; INTRAVENOUS
Status: DISCONTINUED | OUTPATIENT
Start: 2021-10-07 | End: 2021-10-08 | Stop reason: HOSPADM

## 2021-10-07 RX ORDER — LIDOCAINE 40 MG/G
CREAM TOPICAL
Status: DISCONTINUED | OUTPATIENT
Start: 2021-10-07 | End: 2021-10-11 | Stop reason: HOSPADM

## 2021-10-07 RX ADMIN — SODIUM CHLORIDE, POTASSIUM CHLORIDE, SODIUM LACTATE AND CALCIUM CHLORIDE 1000 ML: 600; 310; 30; 20 INJECTION, SOLUTION INTRAVENOUS at 23:19

## 2021-10-07 RX ADMIN — SODIUM CHLORIDE, POTASSIUM CHLORIDE, SODIUM LACTATE AND CALCIUM CHLORIDE 1000 ML: 600; 310; 30; 20 INJECTION, SOLUTION INTRAVENOUS at 16:36

## 2021-10-07 RX ADMIN — Medication 2 MILLI-UNITS/MIN: at 16:37

## 2021-10-07 RX ADMIN — ONDANSETRON 4 MG: 2 INJECTION INTRAMUSCULAR; INTRAVENOUS at 20:13

## 2021-10-07 RX ADMIN — CALCIUM CARBONATE (ANTACID) CHEW TAB 500 MG 500 MG: 500 CHEW TAB at 21:30

## 2021-10-07 ASSESSMENT — MIFFLIN-ST. JEOR: SCORE: 1785.89

## 2021-10-07 NOTE — PROGRESS NOTES
"Outpatient/Triage Note:    Patient Name:  Kavya Butts  :      1990  MRN:      [id number]      Assessment:   @ 38w1d here for evaluation of elevated BP during routine OBV.     Plan:   - HELLP panel thus far WNL, awaiting Pr-Cr ratio. Continue serial BP monitoring. Discussed possible IOL should elevations >140/90 continue. She is agreeable to this as needed.       Subjective:  Kavya Butts is a 31 year old  at 38 weeks, who presented to [facility] for evaluation of elevated Bp.Denies leaking of fluid, bleeding, or changes in fetal movement.     Objective:  Vital signs: /76   Pulse 99   Temp 98  F (36.7  C) (Oral)   Resp 18   Ht 1.727 m (5' 8\")   Wt 57.6 kg (127 lb)   SpO2 100%   BMI 19.31 kg/m    FHR: 135, external monitoring very challenging due to palpable fetal movement  Uterine contractions: Irregular 5-8, painless    Physical Exam: A&Ox3  Abdomen: SIUP, Cephalic by Leopold's, abdomen non-tender  SVE: deferred  Legs: +2 edema, +2 DTR, moves all freely      Results:  Recent Results (from the past 168 hour(s))   CBC with platelets   Result Value Ref Range Status    WBC Count 7.3 4.0 - 11.0 10e3/uL Final    RBC Count 4.12 3.80 - 5.20 10e6/uL Final    Hemoglobin 10.5 (L) 11.7 - 15.7 g/dL Final    Hematocrit 32.8 (L) 35.0 - 47.0 % Final    MCV 80 78 - 100 fL Final    MCH 25.5 (L) 26.5 - 33.0 pg Final    MCHC 32.0 31.5 - 36.5 g/dL Final    RDW 14.1 10.0 - 15.0 % Final    Platelet Count 296 150 - 450 10e3/uL Final   AST   Result Value Ref Range Status    AST 9 0 - 40 U/L Final   ALT   Result Value Ref Range Status    ALT <9 0 - 45 U/L Final   Creatinine   Result Value Ref Range Status    Creatinine 0.66 0.60 - 1.10 mg/dL Final    GFR Estimate >90 >60 mL/min/1.73m2 Final   Adult Type and Screen   Result Value Ref Range Status    ABO/RH(D) O POS  Final    Antibody Screen Negative Negative Final    SPECIMEN EXPIRATION DATE [id number]  Final     *Note: Due to a large number of results " and/or encounters for the requested time period, some results have not been displayed. A complete set of results can be found in Results Review.         Provider: Libertad Mckeon CNM

## 2021-10-07 NOTE — PROGRESS NOTES
SARWAT Mckeon updated on De La Torre score of 6. 1/50/-2. Patient's contractions have spaced out.  Patient was off monitor for bath and walking; ok per SARWAT Mckeon.  Cook catheter indicated.  Ok to start IV and low dose pitocin.

## 2021-10-07 NOTE — PROGRESS NOTES
This RN at bedside from [phone] trying to keep fetus on monitor, unable to obtain NST. Marlene Rodriguez is not available at this time.

## 2021-10-07 NOTE — PROGRESS NOTES
Patient here from clinic visit for further blood pressure eval. EFM placed, VS obtained. Patient denies headache, RUQ pain, visual disturbance. Pt does have swelling to feet/ankles. Libertad COBIANM aware patient is here and placed orders for HELLP panel.

## 2021-10-07 NOTE — PROGRESS NOTES
Difficult to obtain NST, fetus is audibly and visibly very active, can hear hiccups on and off but at this time unable to obtain reactive NST due to fetal activity. Mother assisted to sidelying position with no improvement on FHT tracing. Libertad Mckeon CNM made aware.

## 2021-10-07 NOTE — PROGRESS NOTES
Given no cervical change despite mild-moderate contractions throughout the afternoon, discussed with pt the indications for, risks, benefits and alternatives of cook catheter placement for cervical ripening. Informed verbal consent obtained. Pt placed in the lithotomy position. Cervix 1/50/-2, cephalic by digital exam. Cook catheter, with stylet, inserted into the vagina and traversed through the cervix until the uterine balloon was above the level of the internal os. Stylet removed and catheter advanced until both balloons entered the cervical canal. 40 mL of saline instilled into the uterine balloon. Catheter was gently pulled back until the uterine balloon abutted the internal os.An additional 40 mL of saline instilled in uterine balloon only for a total of 80 mL. Pt tolerated procedure well. Orders placed for low-dose pitocin, titrate to max 8 milliunit/min while cook balloon is in place. Check around balloon in 4-5 hours

## 2021-10-08 ENCOUNTER — ANCILLARY PROCEDURE (OUTPATIENT)
Dept: ULTRASOUND IMAGING | Facility: CLINIC | Age: 31
End: 2021-10-08
Attending: ANESTHESIOLOGY
Payer: COMMERCIAL

## 2021-10-08 ENCOUNTER — ANESTHESIA EVENT (OUTPATIENT)
Dept: OBGYN | Facility: CLINIC | Age: 31
End: 2021-10-08
Payer: COMMERCIAL

## 2021-10-08 ENCOUNTER — ANESTHESIA (OUTPATIENT)
Dept: OBGYN | Facility: CLINIC | Age: 31
End: 2021-10-08
Payer: COMMERCIAL

## 2021-10-08 LAB — T PALLIDUM AB SER QL: NEGATIVE

## 2021-10-08 PROCEDURE — 250N000011 HC RX IP 250 OP 636: Performed by: REGISTERED NURSE

## 2021-10-08 PROCEDURE — 250N000009 HC RX 250: Performed by: OBSTETRICS & GYNECOLOGY

## 2021-10-08 PROCEDURE — C9803 HOPD COVID-19 SPEC COLLECT: HCPCS

## 2021-10-08 PROCEDURE — 258N000003 HC RX IP 258 OP 636: Performed by: NURSE ANESTHETIST, CERTIFIED REGISTERED

## 2021-10-08 PROCEDURE — 250N000011 HC RX IP 250 OP 636: Performed by: ANESTHESIOLOGY

## 2021-10-08 PROCEDURE — 250N000011 HC RX IP 250 OP 636: Performed by: OBSTETRICS & GYNECOLOGY

## 2021-10-08 PROCEDURE — 250N000013 HC RX MED GY IP 250 OP 250 PS 637: Performed by: OBSTETRICS & GYNECOLOGY

## 2021-10-08 PROCEDURE — 250N000009 HC RX 250: Performed by: NURSE ANESTHETIST, CERTIFIED REGISTERED

## 2021-10-08 PROCEDURE — 250N000011 HC RX IP 250 OP 636: Performed by: NURSE ANESTHETIST, CERTIFIED REGISTERED

## 2021-10-08 PROCEDURE — 88307 TISSUE EXAM BY PATHOLOGIST: CPT | Mod: TC | Performed by: OBSTETRICS & GYNECOLOGY

## 2021-10-08 PROCEDURE — 258N000003 HC RX IP 258 OP 636: Performed by: ANESTHESIOLOGY

## 2021-10-08 PROCEDURE — 999N000249 HC STATISTIC C-SECTION ON UNIT

## 2021-10-08 PROCEDURE — 360N000076 HC SURGERY LEVEL 3, PER MIN: Performed by: OBSTETRICS & GYNECOLOGY

## 2021-10-08 PROCEDURE — 120N000001 HC R&B MED SURG/OB

## 2021-10-08 PROCEDURE — 250N000013 HC RX MED GY IP 250 OP 250 PS 637: Performed by: REGISTERED NURSE

## 2021-10-08 PROCEDURE — 370N000017 HC ANESTHESIA TECHNICAL FEE, PER MIN: Performed by: OBSTETRICS & GYNECOLOGY

## 2021-10-08 PROCEDURE — 272N000001 HC OR GENERAL SUPPLY STERILE: Performed by: OBSTETRICS & GYNECOLOGY

## 2021-10-08 PROCEDURE — 250N000009 HC RX 250: Performed by: ANESTHESIOLOGY

## 2021-10-08 PROCEDURE — 250N000009 HC RX 250: Performed by: REGISTERED NURSE

## 2021-10-08 RX ORDER — MISOPROSTOL 200 UG/1
400 TABLET ORAL
Status: DISCONTINUED | OUTPATIENT
Start: 2021-10-08 | End: 2021-10-08

## 2021-10-08 RX ORDER — OXYTOCIN/0.9 % SODIUM CHLORIDE 30/500 ML
340 PLASTIC BAG, INJECTION (ML) INTRAVENOUS CONTINUOUS PRN
Status: DISCONTINUED | OUTPATIENT
Start: 2021-10-08 | End: 2021-10-11 | Stop reason: HOSPADM

## 2021-10-08 RX ORDER — OXYTOCIN 10 [USP'U]/ML
10 INJECTION, SOLUTION INTRAMUSCULAR; INTRAVENOUS
Status: DISCONTINUED | OUTPATIENT
Start: 2021-10-08 | End: 2021-10-11 | Stop reason: HOSPADM

## 2021-10-08 RX ORDER — OXYTOCIN/0.9 % SODIUM CHLORIDE 30/500 ML
100-340 PLASTIC BAG, INJECTION (ML) INTRAVENOUS CONTINUOUS PRN
Status: DISCONTINUED | OUTPATIENT
Start: 2021-10-08 | End: 2021-10-11 | Stop reason: HOSPADM

## 2021-10-08 RX ORDER — LIDOCAINE 40 MG/G
CREAM TOPICAL
Status: DISCONTINUED | OUTPATIENT
Start: 2021-10-08 | End: 2021-10-08 | Stop reason: HOSPADM

## 2021-10-08 RX ORDER — CARBOPROST TROMETHAMINE 250 UG/ML
250 INJECTION, SOLUTION INTRAMUSCULAR
Status: DISCONTINUED | OUTPATIENT
Start: 2021-10-08 | End: 2021-10-11 | Stop reason: HOSPADM

## 2021-10-08 RX ORDER — METOCLOPRAMIDE 10 MG/1
10 TABLET ORAL EVERY 6 HOURS PRN
Status: DISCONTINUED | OUTPATIENT
Start: 2021-10-08 | End: 2021-10-11 | Stop reason: HOSPADM

## 2021-10-08 RX ORDER — PROCHLORPERAZINE 25 MG
25 SUPPOSITORY, RECTAL RECTAL EVERY 12 HOURS PRN
Status: DISCONTINUED | OUTPATIENT
Start: 2021-10-08 | End: 2021-10-11 | Stop reason: HOSPADM

## 2021-10-08 RX ORDER — SODIUM CHLORIDE, SODIUM LACTATE, POTASSIUM CHLORIDE, CALCIUM CHLORIDE 600; 310; 30; 20 MG/100ML; MG/100ML; MG/100ML; MG/100ML
INJECTION, SOLUTION INTRAVENOUS CONTINUOUS PRN
Status: DISCONTINUED | OUTPATIENT
Start: 2021-10-08 | End: 2021-10-08

## 2021-10-08 RX ORDER — ONDANSETRON 2 MG/ML
4 INJECTION INTRAMUSCULAR; INTRAVENOUS EVERY 6 HOURS PRN
Status: DISCONTINUED | OUTPATIENT
Start: 2021-10-08 | End: 2021-10-11 | Stop reason: HOSPADM

## 2021-10-08 RX ORDER — METHYLERGONOVINE MALEATE 0.2 MG/ML
200 INJECTION INTRAVENOUS
Status: DISCONTINUED | OUTPATIENT
Start: 2021-10-08 | End: 2021-10-08 | Stop reason: HOSPADM

## 2021-10-08 RX ORDER — NALOXONE HYDROCHLORIDE 0.4 MG/ML
0.4 INJECTION, SOLUTION INTRAMUSCULAR; INTRAVENOUS; SUBCUTANEOUS
Status: DISCONTINUED | OUTPATIENT
Start: 2021-10-08 | End: 2021-10-11 | Stop reason: HOSPADM

## 2021-10-08 RX ORDER — NALOXONE HYDROCHLORIDE 0.4 MG/ML
0.2 INJECTION, SOLUTION INTRAMUSCULAR; INTRAVENOUS; SUBCUTANEOUS
Status: DISCONTINUED | OUTPATIENT
Start: 2021-10-08 | End: 2021-10-11 | Stop reason: HOSPADM

## 2021-10-08 RX ORDER — SIMETHICONE 80 MG
80 TABLET,CHEWABLE ORAL 4 TIMES DAILY PRN
Status: DISCONTINUED | OUTPATIENT
Start: 2021-10-08 | End: 2021-10-11 | Stop reason: HOSPADM

## 2021-10-08 RX ORDER — FENTANYL CITRATE 50 UG/ML
50 INJECTION, SOLUTION INTRAMUSCULAR; INTRAVENOUS
Status: DISCONTINUED | OUTPATIENT
Start: 2021-10-08 | End: 2021-10-08 | Stop reason: HOSPADM

## 2021-10-08 RX ORDER — HYDROCORTISONE 2.5 %
CREAM (GRAM) TOPICAL 3 TIMES DAILY PRN
Status: DISCONTINUED | OUTPATIENT
Start: 2021-10-08 | End: 2021-10-11 | Stop reason: HOSPADM

## 2021-10-08 RX ORDER — LIDOCAINE 40 MG/G
CREAM TOPICAL
Status: DISCONTINUED | OUTPATIENT
Start: 2021-10-08 | End: 2021-10-11 | Stop reason: HOSPADM

## 2021-10-08 RX ORDER — IBUPROFEN 800 MG/1
800 TABLET, FILM COATED ORAL EVERY 6 HOURS
Status: DISCONTINUED | OUTPATIENT
Start: 2021-10-09 | End: 2021-10-11 | Stop reason: HOSPADM

## 2021-10-08 RX ORDER — OXYTOCIN 10 [USP'U]/ML
10 INJECTION, SOLUTION INTRAMUSCULAR; INTRAVENOUS
Status: DISCONTINUED | OUTPATIENT
Start: 2021-10-08 | End: 2021-10-08 | Stop reason: HOSPADM

## 2021-10-08 RX ORDER — METHYLERGONOVINE MALEATE 0.2 MG/ML
200 INJECTION INTRAVENOUS
Status: DISCONTINUED | OUTPATIENT
Start: 2021-10-08 | End: 2021-10-11 | Stop reason: HOSPADM

## 2021-10-08 RX ORDER — MISOPROSTOL 200 UG/1
800 TABLET ORAL
Status: DISCONTINUED | OUTPATIENT
Start: 2021-10-08 | End: 2021-10-08

## 2021-10-08 RX ORDER — PROCHLORPERAZINE MALEATE 10 MG
10 TABLET ORAL EVERY 6 HOURS PRN
Status: DISCONTINUED | OUTPATIENT
Start: 2021-10-08 | End: 2021-10-11 | Stop reason: HOSPADM

## 2021-10-08 RX ORDER — ONDANSETRON 4 MG/1
4 TABLET, ORALLY DISINTEGRATING ORAL EVERY 30 MIN PRN
Status: DISCONTINUED | OUTPATIENT
Start: 2021-10-08 | End: 2021-10-08

## 2021-10-08 RX ORDER — MAGNESIUM HYDROXIDE 1200 MG/15ML
LIQUID ORAL PRN
Status: DISCONTINUED | OUTPATIENT
Start: 2021-10-08 | End: 2021-10-11 | Stop reason: HOSPADM

## 2021-10-08 RX ORDER — EPHEDRINE SULFATE 50 MG/ML
INJECTION, SOLUTION INTRAMUSCULAR; INTRAVENOUS; SUBCUTANEOUS PRN
Status: DISCONTINUED | OUTPATIENT
Start: 2021-10-08 | End: 2021-10-08

## 2021-10-08 RX ORDER — SODIUM CHLORIDE, SODIUM LACTATE, POTASSIUM CHLORIDE, CALCIUM CHLORIDE 600; 310; 30; 20 MG/100ML; MG/100ML; MG/100ML; MG/100ML
INJECTION, SOLUTION INTRAVENOUS CONTINUOUS
Status: DISCONTINUED | OUTPATIENT
Start: 2021-10-08 | End: 2021-10-08 | Stop reason: HOSPADM

## 2021-10-08 RX ORDER — BUPIVACAINE HYDROCHLORIDE AND EPINEPHRINE 2.5; 5 MG/ML; UG/ML
INJECTION, SOLUTION INFILTRATION; PERINEURAL
Status: DISCONTINUED | OUTPATIENT
Start: 2021-10-08 | End: 2021-10-08

## 2021-10-08 RX ORDER — FENTANYL CITRATE 50 UG/ML
25 INJECTION, SOLUTION INTRAMUSCULAR; INTRAVENOUS EVERY 5 MIN PRN
Status: DISCONTINUED | OUTPATIENT
Start: 2021-10-08 | End: 2021-10-08

## 2021-10-08 RX ORDER — AMOXICILLIN 250 MG
1 CAPSULE ORAL 2 TIMES DAILY
Status: DISCONTINUED | OUTPATIENT
Start: 2021-10-08 | End: 2021-10-11 | Stop reason: HOSPADM

## 2021-10-08 RX ORDER — ONDANSETRON 2 MG/ML
4 INJECTION INTRAMUSCULAR; INTRAVENOUS EVERY 30 MIN PRN
Status: DISCONTINUED | OUTPATIENT
Start: 2021-10-08 | End: 2021-10-08

## 2021-10-08 RX ORDER — KETOROLAC TROMETHAMINE 30 MG/ML
30 INJECTION, SOLUTION INTRAMUSCULAR; INTRAVENOUS EVERY 6 HOURS
Status: COMPLETED | OUTPATIENT
Start: 2021-10-08 | End: 2021-10-09

## 2021-10-08 RX ORDER — AMOXICILLIN 250 MG
2 CAPSULE ORAL 2 TIMES DAILY
Status: DISCONTINUED | OUTPATIENT
Start: 2021-10-08 | End: 2021-10-11 | Stop reason: HOSPADM

## 2021-10-08 RX ORDER — MODIFIED LANOLIN
OINTMENT (GRAM) TOPICAL
Status: DISCONTINUED | OUTPATIENT
Start: 2021-10-08 | End: 2021-10-11 | Stop reason: HOSPADM

## 2021-10-08 RX ORDER — MISOPROSTOL 200 UG/1
800 TABLET ORAL
Status: DISCONTINUED | OUTPATIENT
Start: 2021-10-08 | End: 2021-10-11 | Stop reason: HOSPADM

## 2021-10-08 RX ORDER — CEFAZOLIN SODIUM 2 G/100ML
2 INJECTION, SOLUTION INTRAVENOUS
Status: COMPLETED | OUTPATIENT
Start: 2021-10-08 | End: 2021-10-08

## 2021-10-08 RX ORDER — CITRIC ACID/SODIUM CITRATE 334-500MG
30 SOLUTION, ORAL ORAL
Status: COMPLETED | OUTPATIENT
Start: 2021-10-08 | End: 2021-10-08

## 2021-10-08 RX ORDER — BUPIVACAINE HYDROCHLORIDE 7.5 MG/ML
INJECTION, SOLUTION INTRASPINAL
Status: COMPLETED | OUTPATIENT
Start: 2021-10-08 | End: 2021-10-08

## 2021-10-08 RX ORDER — KETOROLAC TROMETHAMINE 30 MG/ML
INJECTION, SOLUTION INTRAMUSCULAR; INTRAVENOUS PRN
Status: DISCONTINUED | OUTPATIENT
Start: 2021-10-08 | End: 2021-10-08

## 2021-10-08 RX ORDER — METOCLOPRAMIDE HYDROCHLORIDE 5 MG/ML
10 INJECTION INTRAMUSCULAR; INTRAVENOUS EVERY 6 HOURS PRN
Status: DISCONTINUED | OUTPATIENT
Start: 2021-10-08 | End: 2021-10-11 | Stop reason: HOSPADM

## 2021-10-08 RX ORDER — ONDANSETRON 4 MG/1
4 TABLET, ORALLY DISINTEGRATING ORAL EVERY 6 HOURS PRN
Status: DISCONTINUED | OUTPATIENT
Start: 2021-10-08 | End: 2021-10-11 | Stop reason: HOSPADM

## 2021-10-08 RX ORDER — OXYTOCIN/0.9 % SODIUM CHLORIDE 30/500 ML
340 PLASTIC BAG, INJECTION (ML) INTRAVENOUS CONTINUOUS PRN
Status: DISCONTINUED | OUTPATIENT
Start: 2021-10-08 | End: 2021-10-08 | Stop reason: HOSPADM

## 2021-10-08 RX ORDER — BISACODYL 10 MG
10 SUPPOSITORY, RECTAL RECTAL DAILY PRN
Status: DISCONTINUED | OUTPATIENT
Start: 2021-10-10 | End: 2021-10-11 | Stop reason: HOSPADM

## 2021-10-08 RX ORDER — MORPHINE SULFATE 2 MG/ML
INJECTION, SOLUTION INTRAMUSCULAR; INTRAVENOUS PRN
Status: DISCONTINUED | OUTPATIENT
Start: 2021-10-08 | End: 2021-10-08

## 2021-10-08 RX ORDER — OXYCODONE HYDROCHLORIDE 5 MG/1
5 TABLET ORAL EVERY 4 HOURS PRN
Status: DISCONTINUED | OUTPATIENT
Start: 2021-10-08 | End: 2021-10-11 | Stop reason: HOSPADM

## 2021-10-08 RX ORDER — OXYCODONE HYDROCHLORIDE 5 MG/1
5 TABLET ORAL EVERY 4 HOURS PRN
Status: DISCONTINUED | OUTPATIENT
Start: 2021-10-08 | End: 2021-10-08

## 2021-10-08 RX ORDER — ACETAMINOPHEN 325 MG/1
975 TABLET ORAL EVERY 6 HOURS
Status: DISCONTINUED | OUTPATIENT
Start: 2021-10-08 | End: 2021-10-11 | Stop reason: HOSPADM

## 2021-10-08 RX ORDER — CARBOPROST TROMETHAMINE 250 UG/ML
250 INJECTION, SOLUTION INTRAMUSCULAR
Status: DISCONTINUED | OUTPATIENT
Start: 2021-10-08 | End: 2021-10-08 | Stop reason: HOSPADM

## 2021-10-08 RX ORDER — SODIUM CHLORIDE, SODIUM LACTATE, POTASSIUM CHLORIDE, CALCIUM CHLORIDE 600; 310; 30; 20 MG/100ML; MG/100ML; MG/100ML; MG/100ML
INJECTION, SOLUTION INTRAVENOUS CONTINUOUS
Status: DISCONTINUED | OUTPATIENT
Start: 2021-10-08 | End: 2021-10-11 | Stop reason: HOSPADM

## 2021-10-08 RX ORDER — ACETAMINOPHEN 325 MG/1
975 TABLET ORAL ONCE
Status: COMPLETED | OUTPATIENT
Start: 2021-10-08 | End: 2021-10-08

## 2021-10-08 RX ORDER — CEFAZOLIN SODIUM 2 G/100ML
2 INJECTION, SOLUTION INTRAVENOUS SEE ADMIN INSTRUCTIONS
Status: DISCONTINUED | OUTPATIENT
Start: 2021-10-08 | End: 2021-10-08 | Stop reason: HOSPADM

## 2021-10-08 RX ORDER — MORPHINE SULFATE 1 MG/ML
INJECTION, SOLUTION EPIDURAL; INTRATHECAL; INTRAVENOUS
Status: COMPLETED | OUTPATIENT
Start: 2021-10-08 | End: 2021-10-08

## 2021-10-08 RX ORDER — MISOPROSTOL 200 UG/1
400 TABLET ORAL
Status: DISCONTINUED | OUTPATIENT
Start: 2021-10-08 | End: 2021-10-11 | Stop reason: HOSPADM

## 2021-10-08 RX ORDER — DEXTROSE, SODIUM CHLORIDE, SODIUM LACTATE, POTASSIUM CHLORIDE, AND CALCIUM CHLORIDE 5; .6; .31; .03; .02 G/100ML; G/100ML; G/100ML; G/100ML; G/100ML
INJECTION, SOLUTION INTRAVENOUS CONTINUOUS
Status: DISCONTINUED | OUTPATIENT
Start: 2021-10-08 | End: 2021-10-11 | Stop reason: HOSPADM

## 2021-10-08 RX ADMIN — SODIUM CHLORIDE, POTASSIUM CHLORIDE, SODIUM LACTATE AND CALCIUM CHLORIDE: 600; 310; 30; 20 INJECTION, SOLUTION INTRAVENOUS at 16:00

## 2021-10-08 RX ADMIN — ONDANSETRON 4 MG: 2 INJECTION INTRAMUSCULAR; INTRAVENOUS at 05:32

## 2021-10-08 RX ADMIN — KETOROLAC TROMETHAMINE 30 MG: 30 INJECTION, SOLUTION INTRAMUSCULAR at 23:12

## 2021-10-08 RX ADMIN — MORPHINE SULFATE 1.85 MG: 2 INJECTION, SOLUTION INTRAMUSCULAR; INTRAVENOUS at 16:20

## 2021-10-08 RX ADMIN — PHENYLEPHRINE HYDROCHLORIDE 200 MCG: 10 INJECTION INTRAVENOUS at 15:43

## 2021-10-08 RX ADMIN — ONDANSETRON 4 MG: 2 INJECTION INTRAMUSCULAR; INTRAVENOUS at 23:12

## 2021-10-08 RX ADMIN — SENNOSIDES AND DOCUSATE SODIUM 2 TABLET: 50; 8.6 TABLET ORAL at 20:10

## 2021-10-08 RX ADMIN — CEFAZOLIN SODIUM 2 G: 2 INJECTION, SOLUTION INTRAVENOUS at 15:31

## 2021-10-08 RX ADMIN — Medication 0.15 MG: at 15:38

## 2021-10-08 RX ADMIN — ACETAMINOPHEN 975 MG: 325 TABLET ORAL at 20:10

## 2021-10-08 RX ADMIN — SODIUM CHLORIDE, SODIUM LACTATE, POTASSIUM CHLORIDE, CALCIUM CHLORIDE AND DEXTROSE MONOHYDRATE: 5; 600; 310; 30; 20 INJECTION, SOLUTION INTRAVENOUS at 23:11

## 2021-10-08 RX ADMIN — Medication 100 ML/HR: at 19:28

## 2021-10-08 RX ADMIN — PHENYLEPHRINE HYDROCHLORIDE 0.5 MCG/KG/MIN: 10 INJECTION INTRAVENOUS at 15:41

## 2021-10-08 RX ADMIN — PHENYLEPHRINE HYDROCHLORIDE 100 MCG: 10 INJECTION INTRAVENOUS at 16:12

## 2021-10-08 RX ADMIN — ACETAMINOPHEN 975 MG: 325 TABLET ORAL at 14:30

## 2021-10-08 RX ADMIN — Medication 10 MG: at 15:45

## 2021-10-08 RX ADMIN — PHENYLEPHRINE HYDROCHLORIDE 200 MCG: 10 INJECTION INTRAVENOUS at 16:16

## 2021-10-08 RX ADMIN — CALCIUM CARBONATE (ANTACID) CHEW TAB 500 MG 500 MG: 500 CHEW TAB at 10:49

## 2021-10-08 RX ADMIN — BUPIVACAINE HYDROCHLORIDE AND EPINEPHRINE BITARTRATE 50 ML: 2.5; .005 INJECTION, SOLUTION INFILTRATION; PERINEURAL at 16:36

## 2021-10-08 RX ADMIN — BUPIVACAINE HYDROCHLORIDE IN DEXTROSE 1.6 ML: 7.5 INJECTION, SOLUTION SUBARACHNOID at 15:38

## 2021-10-08 RX ADMIN — SODIUM CITRATE AND CITRIC ACID MONOHYDRATE 30 ML: 500; 334 SOLUTION ORAL at 14:30

## 2021-10-08 RX ADMIN — SODIUM CHLORIDE, POTASSIUM CHLORIDE, SODIUM LACTATE AND CALCIUM CHLORIDE: 600; 310; 30; 20 INJECTION, SOLUTION INTRAVENOUS at 15:16

## 2021-10-08 RX ADMIN — SODIUM CHLORIDE, POTASSIUM CHLORIDE, SODIUM LACTATE AND CALCIUM CHLORIDE: 600; 310; 30; 20 INJECTION, SOLUTION INTRAVENOUS at 14:04

## 2021-10-08 RX ADMIN — Medication 300 ML/HR: at 15:56

## 2021-10-08 RX ADMIN — CALCIUM CARBONATE (ANTACID) CHEW TAB 500 MG 500 MG: 500 CHEW TAB at 05:32

## 2021-10-08 RX ADMIN — KETOROLAC TROMETHAMINE 30 MG: 30 INJECTION, SOLUTION INTRAMUSCULAR at 16:25

## 2021-10-08 NOTE — PROGRESS NOTES
"Patient Name:  Kavya Butts  :      1990  MRN:      [id number]    Assessment:     at 38w2d  Early labor  Category 1 FHTs  S/p cook catheter  Pitocin augmentation  gHTN-HELLP panel WNL  GBS negative      Plan:   - Cook catheter was the majority of the way through a 3cm cervix and was causing significant maternal discomfort. Balloon was taken down and removed. Pitocin order maximum adjusted to 24 milliunit/min.    -Routine support & management. Encourage position changes, ambulation, rest as desired. Encouraged upright positioning following cook removal to optimal fetal descent  -Anticipate progress and NSVB.   -Reevaluate progress in 6-8 hours or sooner with a change in status.     Subjective:  Kavya Butts is coping well with contractions, enjoyed time in the bath. Good PO fluid intake.  Voiding without issue. Family supportive at bedside.      Objective:  /75   Pulse 100   Temp 98.2  F (36.8  C) (Oral)   Resp 18   Ht 1.727 m (5' 8\")   Wt 103.2 kg (227 lb 9.6 oz)   SpO2 100%   Breastfeeding Yes   BMI 34.61 kg/m      FHR:Baseline: 140 bpm, Variability: Moderate (6 - 25 bpm), Accelerations: present and Decelerations: Variable: single isolated    Uterine contractions:TocoFrequency: Every 3-6 minutes, Duration: 40-60 seconds and Intensity: mild    SVE:3/50/-3    Provider:  Libertad Mckeon CNM      Date:  10/8/2021  Time:  1:49 AM    "

## 2021-10-08 NOTE — OP NOTE
PROCEDURE NOTE:      NAME:  Kavya Butts   RECORD # [id number]   ADMIT DATE: 10/7/2021    DATE OF SERVICE: 10/8/2021     PREOPERATIVE DIAGNOSIS: malpresentation: Breech presentation    PROCEDURE: Low transverse  section      SURGEON:  CHRIS MORA MD     ASSISTANT: Thania Prajapati    ANESTHESIA: spinal    ESTIMATED BLOOD LOSS:826 mL    DRAINS: Hedrick catheter.    COMPLICATIONS: none    FINDINGS: Normal uterus, tubes and ovaries bilateral. Normal appearance to the adnexae.  Live female infant born, Apgars of 9   at 1 minute, 9  at 5 minutes,  Weight (oz):  119  oz.    CONSENT: Patient was met preoperatively where we discussed the procedure and the risks associated with the procedure.  She understood these to include but not limited to injury to adjacent organs including bowel, bladder, ureter, infection and bleeding. Understanding these risks her consents were signed.      PROCEDURE: Patient was brought to the operating room in stable condition.  After induction of a spinal anesthetic, fetal heart tones were checked and were stable. She was prepped and draped in sterile fashion for the procedure.  A timeout was then performed.      A Pfannenstiel skin incision was made to the level of the fascia.  The fascia was incised laterally. Kocher clamps were applied to the superior aspect of the incision which was sharply dissected from the rectus muscles.  The kocher clamps were then reapplied to the inferior aspect of the incision which was similarly sharply dissected from the rectus muscles.  The rectus muscles were  bluntly in the midline.  The peritoneum was entered sharply. This incision was then extended.  A bladder blade was introduced. The vesicouterine peritoneum was identified and a bladder flap was  created.  A low transverse uterine incision was made and amniotic sac was ruptured revealing clear amniotic fluid.    The baby was transverse lye, head to the mother's right side, dorsum  superior, I tried to reach for the baby's feet but the head was closer to the hysterotomy.  With external pressure the head was guided towards the hysterotomy and with abdominal pressure the baby's head was then delivered.There was a nuchal cord noted. There was a spontaneous cry and therefore bulb suction was performed. The remainder of the infant was easily delivered. The cord was clamped x 2 and cut and the infant handed off to waiting nursing personnel.    The placenta was then manually removed from the uterus.  The uterus was exteriorized, covered with a moist laparotomy sponge and cleared of all clots and debris.  The uterine incision was closed with 0 chromic from both angles in a running locking suture and  A second imbricating suture of 0 was used for hemostasis. The uterus was returned to the abdominopelvic cavity.  The pericolic gutters were cleared of all clots and debris.  The uterine incision was again inspected and noted to be hemostatic.  The peritoneum was closed with suture superiorly to inferiorly.  The rectus muscles were made hemostatic with the use of electrocautery and brought together with figure-of-8 sutures of suture, the fascia was brought together with PDS loop from both angles in a running non locking suture, met at the midline, the subcutaneous tissues were irrigated, made hemostatic with use of electrocautery and brought together with 3-0 plain.  Skin was closed with Insorb staples.  Patient tolerated this procedure well.  Sponge, lap and needle counts were correct x two.    CC: Cintia Jensen, CHRIS MORA MD , Libertad Mckeon CNM

## 2021-10-08 NOTE — H&P
"HISTORY AND PHYSICAL UPDATE ADMISSION EXAM    Name: Kavya Butts  YOB: 1990  Medical Record Number: [id number]    History of Present Illness: Kavya Butts is a 31 year old female who is 38w1d pregnant and being admitted for induction of labor, indication pregnancy-induced hypertension.    Estimated Date of Delivery: Oct 20, 2021    EGA 38w1d    OB History    Para Term  AB Living   4 2 2 0 1 2   SAB TAB Ectopic Multiple Live Births   1 0 0 0 2      # Outcome Date GA Lbr Boby/2nd Weight Sex Delivery Anes PTL Lv   4 Current            3 SAB            2 Term         VINNY   1 Term         VINNY        Lab Results   Component Value Date    AS Negative 10/07/2021    HGB 10.5 (L) 10/07/2021       Prenatal Complications:   gHTN with normal HELLP panel 10/7/21  Anemia  Obesity (BMI 34)  Failed 1 hour GCT, passed 3hr GTT      Exam:      /86   Pulse 99   Temp 98  F (36.7  C) (Oral)   Resp 18   Ht 1.727 m (5' 8\")   Wt 103.2 kg (227 lb 9.6 oz)   SpO2 100%   BMI 34.61 kg/m      Fetal heart Rate Category 1, external monitoring remains difficult due to fetal movement and maternal habitus. Will use Marlene when available.   Contractions 2-5 per patient report, mild by palpation    HEENT grossly normal  Neck: no lymphadenopathy or thryoidomegaly  Lungs CTAB  Heart RRR  ABD gravid, non-tender  EXT:  +2 edema, moves freely  Vaginal exam 1.5/60/-2 per KELI Duggan in clinic today  Membranes: intact    Assessment: induction of labor, indication pregnancy-induced hypertension   GBS negative    Plan: Admit - see IP orders  cervical ripening with cook catheter and low dose pitocin if contractions space out, currently beginning to feel more painful contractions. Will reassess need for IOL agent in 2-3 hours.   Pain medication as desired. May have epidural  Anticipate   Active management of third stage    Prenatal record reviewed.    LATONYA Fofana Dr. remains available for consultation and " collaboration as needed.        10/7/2021   12:34 PM

## 2021-10-08 NOTE — PLAN OF CARE
Per Libertad Mckeon CNM, RN turned oxytocin off at 0528. Will reassess at 0700. Patient up to tub.

## 2021-10-08 NOTE — ANESTHESIA PROCEDURE NOTES
Intrathecal injection Procedure Note    Pre-Procedure   Staff -        Performed By: anesthesiologist       Location: OR       Pre-Anesthestic Checklist: patient identified, IV checked, risks and benefits discussed, informed consent, monitors and equipment checked, pre-op evaluation, at physician/surgeon's request and post-op pain management  Timeout:       Correct Patient: Yes        Correct Procedure: Yes        Correct Site: Yes        Correct Position: Yes   Procedure Documentation  Procedure: intrathecal injection       Patient Position: sitting       Skin prep: Chloraprep       Insertion Site: L3-4. (midline approach).       Needle Gauge: 24.        Needle Length (Inches): 3.5        Spinal Needle Type: Pencan       Introducer used      # of attempts: 1 and  # of redirects:     Assessment/Narrative         Paresthesias: No.       CSF fluid: clear.    Medication(s) Administered   0.75% Hyperbaric Bupivacaine (Intrathecal), 1.6 mL  Morphine PF 1 mg/mL (Intrathecal), 0.15 mg  Medication Administration Time: 10/8/2021 3:38 PM

## 2021-10-08 NOTE — PROGRESS NOTES
"Patient Name:  Kavya Butts  :      1990  MRN:      [id number]    Assessment:    G 4   at 38w2d  Category I FHTs  Breech presentation    Plan:   Scheduled for elective  section at 3 pm    Subjective:  Kavya Butts is doing well, upset for the fetus is now confirmed breech presentation.  Not having uterine contractions.    Objective:  /83   Pulse 100   Temp 98  F (36.7  C) (Oral)   Resp 16   Ht 1.727 m (5' 8\")   Wt 103.2 kg (227 lb 9.6 oz)   SpO2 100%   Breastfeeding Yes   BMI 34.61 kg/m     Temp:  [97.8  F (36.6  C)-98.6  F (37  C)] 98  F (36.7  C)  Pulse:  [] 100  Resp:  [16-18] 16  BP: (113-147)/(75-98) 127/83  SpO2:  [100 %] 100 %    Lucid, NAD, hydrated  No respiratory distress  Abdomen soft, non tender.  Bed side ultrasound: may, gwendolyn breech presentation, spine to right maternal side. Placenta posterior. LATRICIA 6 cm.  FHR:Baseline: 140 bpm, Variability: Moderate (6 - 25 bpm), Accelerations: present and Decelerations: Absent  Uterine contractions:occasional, not felt.  SVE:3 cm dilated, 50% effaced per report.    We discussed the admission diagnosis, indication for delivery, today's finding, management alternatives, risks and possible complications.  Questions were answered to her satisfaction.    She understands that ECV is an option with success in the 50%, the LATRICIA is about 6 cm, placenta is posterior, the risk for abruptio is present for hypertension and it is a risk in ECV.    She chose to undergo elective  delivery for breech presentation.    We discussed  sectio procedure, risks and possible complications. Questions were answered to her satisfaction.    Provider:  CHRIS MORA MD      Date:  10/8/2021  Time:  8:59 AM    "

## 2021-10-08 NOTE — ANESTHESIA PROCEDURE NOTES
TAP Procedure Note    Pre-Procedure   Staff -        Anesthesiologist:  Tiffani Torres MD       Performed By: anesthesiologist       Location: OR       Procedure Start/Stop Times: 10/8/2021 4:32 PM and 10/8/2021 4:36 PM       Pre-Anesthestic Checklist: patient identified, IV checked, site marked, risks and benefits discussed, informed consent, monitors and equipment checked, pre-op evaluation, at physician/surgeon's request and post-op pain management  Timeout:       Correct Patient: Yes        Correct Procedure: Yes        Correct Site: Yes        Correct Position: Yes        Correct Laterality: Yes        Site Marked: Yes  Procedure Documentation  Procedure: TAP       Laterality: bilateral       Patient Position: supine       Patient Prep/Sterile Barriers: sterile gloves, mask       Skin prep: Chloraprep       Needle Type: other       Needle Gauge: 20.        Needle Length (Inches): 4        Ultrasound guided       1. Ultrasound was used to identify targeted nerve, plexus, vascular marker, or fascial plane and place a needle adjacent to it in real-time.       2. Ultrasound was used to visualize the spread of anesthetic in close proximity to the above referenced structure.       3. A permanent image is entered into the patient's record.       4. The visualized anatomic structures appeared normal.       5. There were no apparent abnormal pathologic findings.    Assessment/Narrative         The placement was negative for: blood aspirated and site bleeding     Bolus given via needle..        Secured via.        Insertion/Infusion Method: Single Shot       Complications: none    Medication(s) Administered   Bupivacaine 0.25% w/ 1:200K Epi (Injection), 50 mL  Medication Administration Time: 10/8/2021 4:36 PM     Comments:  25mL of local on each side

## 2021-10-08 NOTE — PROGRESS NOTES
"Patient Name:  Kavya Butts  :      1990  MRN:      [id number]    Assessment:     at 38w2d  Unstable lie-breech  Category 1 FHTs  T      Plan:   - Dr. Hall updated to change in fetal lie. He is en route at 0730. Patient remains comfortable.   - NPO  - Patient aware of change to plan of care and possible need for surgical delivery. Has admission labs/blood typing.     Subjective:  Kavya Butts reports that she has become much more comfortable and was able to get some joint release from her hips/lower back that she hadn't previously been able to achieve. Denies noticing a large fetal movement but endorses fairly constant movement throughout much of her pregnancy. Feeling less pelvic pressure now. Cephalic by digital exam following cook removal. Had stopped pitocin at 0530 as she got to 18 milliunit/min and was comfortable without regular contractions. She then got up, took a bath and was able to rest. At 0710 cervical exam unable to feel presenting part of cervical os. Position US now shows breech presentation.  Previously confirmed cephalic by leopold's and digital exam.       Objective:  /87   Pulse 100   Temp 97.8  F (36.6  C) (Oral)   Resp 18   Ht 1.727 m (5' 8\")   Wt 103.2 kg (227 lb 9.6 oz)   SpO2 100%   Breastfeeding Yes   BMI 34.61 kg/m      FHR:Baseline: 130 bpm, Variability: Moderate (6 - 25 bpm), Accelerations: present and Decelerations: intermittent variable decelerations    Uterine contractions:TocoFrequency: Every 2-5 minutes, Duration: 40-60 seconds and Intensity: mild    SVE: Unable to reach cervix without significant maternal discomfort, no presenting part    Provider:  LATONYA Fofana Dr. aware of patient status and remains available for consultation and collaboration as needed.      Date:  10/8/2021  Time:  7:36 AM    "

## 2021-10-08 NOTE — ANESTHESIA CARE TRANSFER NOTE
Patient: Kavya Butts    Procedure: Procedure(s):   SECTION       Diagnosis: Gestational hypertension, third trimester [O13.3]  Diagnosis Additional Information: No value filed.    Anesthesia Type:   No value filed.     Note:    Oropharynx: oropharynx clear of all foreign objects  Level of Consciousness: awake      Independent Airway: airway patency satisfactory and stable  Dentition: dentition unchanged  Vital Signs Stable: post-procedure vital signs reviewed and stable  Report to RN Given: handoff report given  Patient transferred to: Labor and Delivery    Handoff Report: Identifed the Patient, Identified the Reponsible Provider, Reviewed the pertinent medical history, Discussed the surgical course, Reviewed Intra-OP anesthesia mangement and issues during anesthesia, Set expectations for post-procedure period and Allowed opportunity for questions and acknowledgement of understanding      Vitals:  Vitals Value Taken Time   /64 10/08/21 1649   Temp 36.5  C (97.7  F) 10/08/21 1649   Pulse     Resp 18 10/08/21 1649   SpO2 99 % 10/08/21 1649       Electronically Signed By: ERICK Rodriges CRNA  2021  4:50 PM

## 2021-10-09 LAB — HGB BLD-MCNC: 7.8 G/DL (ref 11.7–15.7)

## 2021-10-09 PROCEDURE — 250N000013 HC RX MED GY IP 250 OP 250 PS 637: Performed by: OBSTETRICS & GYNECOLOGY

## 2021-10-09 PROCEDURE — 120N000001 HC R&B MED SURG/OB

## 2021-10-09 PROCEDURE — 250N000013 HC RX MED GY IP 250 OP 250 PS 637: Performed by: ADVANCED PRACTICE MIDWIFE

## 2021-10-09 PROCEDURE — 85018 HEMOGLOBIN: CPT | Performed by: OBSTETRICS & GYNECOLOGY

## 2021-10-09 PROCEDURE — 36415 COLL VENOUS BLD VENIPUNCTURE: CPT | Performed by: OBSTETRICS & GYNECOLOGY

## 2021-10-09 PROCEDURE — 250N000011 HC RX IP 250 OP 636: Performed by: OBSTETRICS & GYNECOLOGY

## 2021-10-09 RX ORDER — FERROUS SULFATE 325(65) MG
325 TABLET ORAL 2 TIMES DAILY
Status: DISCONTINUED | OUTPATIENT
Start: 2021-10-09 | End: 2021-10-11 | Stop reason: HOSPADM

## 2021-10-09 RX ADMIN — KETOROLAC TROMETHAMINE 30 MG: 30 INJECTION, SOLUTION INTRAMUSCULAR at 10:57

## 2021-10-09 RX ADMIN — FERROUS SULFATE TAB 325 MG (65 MG ELEMENTAL FE) 325 MG: 325 (65 FE) TAB at 23:16

## 2021-10-09 RX ADMIN — ACETAMINOPHEN 975 MG: 325 TABLET ORAL at 03:11

## 2021-10-09 RX ADMIN — IBUPROFEN 800 MG: 800 TABLET, FILM COATED ORAL at 17:03

## 2021-10-09 RX ADMIN — SENNOSIDES AND DOCUSATE SODIUM 1 TABLET: 50; 8.6 TABLET ORAL at 09:03

## 2021-10-09 RX ADMIN — ACETAMINOPHEN 975 MG: 325 TABLET ORAL at 09:03

## 2021-10-09 RX ADMIN — ACETAMINOPHEN 975 MG: 325 TABLET ORAL at 14:56

## 2021-10-09 RX ADMIN — SENNOSIDES AND DOCUSATE SODIUM 1 TABLET: 50; 8.6 TABLET ORAL at 11:03

## 2021-10-09 RX ADMIN — IBUPROFEN 800 MG: 800 TABLET, FILM COATED ORAL at 23:14

## 2021-10-09 RX ADMIN — SENNOSIDES AND DOCUSATE SODIUM 2 TABLET: 50; 8.6 TABLET ORAL at 23:15

## 2021-10-09 RX ADMIN — KETOROLAC TROMETHAMINE 30 MG: 30 INJECTION, SOLUTION INTRAMUSCULAR at 04:54

## 2021-10-09 RX ADMIN — ACETAMINOPHEN 975 MG: 325 TABLET ORAL at 23:15

## 2021-10-09 RX ADMIN — FERROUS SULFATE TAB 325 MG (65 MG ELEMENTAL FE) 325 MG: 325 (65 FE) TAB at 11:02

## 2021-10-09 NOTE — PLAN OF CARE
Problem: Bleeding (Postpartum  Delivery)  Goal: Hemostasis  Outcome: Improving     Problem: Infection (Postpartum  Delivery)  Goal: Absence of Infection Signs and Symptoms  Outcome: Improving     Problem: Pain (Postpartum  Delivery)  Goal: Acceptable Pain Control  Outcome: Improving     Problem: Postoperative Nausea and Vomiting (Postpartum  Delivery)  Goal: Nausea and Vomiting Relief  Outcome: Improving

## 2021-10-09 NOTE — PLAN OF CARE
Problem: Adult Inpatient Plan of Care  Goal: Absence of Hospital-Acquired Illness or Injury  Outcome: No Change     Problem: Infection (Postpartum  Delivery)  Goal: Absence of Infection Signs and Symptoms  Outcome: No Change

## 2021-10-09 NOTE — LACTATION NOTE
This note was copied from a baby's chart.  Request by RN and MD to assess latch and transfer.  This is Kavya's 3rd baby, her first didn't breast feed, her second breast fed for 1 year with no difficulties.  This baby has had 2 good feedings since birth and mom has been doing skin to skin and some hand expression into a cup and feeding baby.  During visit, I woke baby up and had mom hand express while I did suck training with a gloved finger.  Placing baby belly to belly in cradle hold on the left breast, mom was able to get a comfortable latch, occasional swallows.  Baby required a lot of stimulation to keep her focused and awake.  She did feed for 20 minutes and mom offered the other side but she was too sleepy.  We reviewed risk factors for milk supply so we initiated pumping with the Symphony in the INITIATE  Setting.  She was able to pump 5 ml which she'll give to baby later.  We discussed feeding baby every 3 hours and pump after feedings.  She has a Medela breast pump for use at home.  We reviewed lactation resources in education folder and breastfeeding essentials book.

## 2021-10-09 NOTE — PROGRESS NOTES
" Postpartum Day 1    Patient Name:  Kavya Butts  :  1990  MRN:  [id number]      Assessment:  Normal postpartum course. Acute blood loss anemia.    Plan:  Continue current care.  PO ferrous sulfate BID.    Subjective:  The patient feels well:  Voiding without difficulty, lochia normal, tolerating normal diet, and passing flatus.  Pain is well controlled with current medications.  The patient has no emotional concerns.  The baby is well and being fed by both breast and bottle.    Objective:  /85   Pulse 88   Temp 98  F (36.7  C) (Oral)   Resp 15   Ht 1.727 m (5' 8\")   Wt 103.2 kg (227 lb 9.6 oz)   SpO2 98%   Breastfeeding Yes   BMI 34.61 kg/m    Patient Vitals for the past 24 hrs:   BP Temp Temp src Pulse Resp SpO2   10/09/21 0600 -- -- -- -- -- 98 %   10/09/21 0500 -- -- -- -- -- 96 %   10/09/21 0400 -- -- -- -- -- 97 %   10/09/21 0308 127/85 98  F (36.7  C) Oral 88 15 98 %   10/09/21 0010 -- -- -- -- -- 96 %   10/08/21 2329 -- -- -- -- 18 --   10/08/21 2325 -- -- -- -- -- 97 %   10/08/21 2321 -- 98.2  F (36.8  C) Oral -- -- --   10/08/21 2320 135/80 98.2  F (36.8  C) Oral -- -- 97 %   10/08/21 2133 -- -- -- -- -- 98 %   10/08/21 2130 131/81 -- -- -- 16 --   10/08/21 2128 -- -- -- -- -- 97 %   10/08/21 2023 -- -- -- -- -- 97 %   10/08/21 2022 132/81 -- -- -- -- --   10/08/21 2018 -- -- -- -- -- 97 %   10/08/21 2013 -- -- -- -- 18 97 %   10/08/21 2012 -- -- -- -- -- 93 %   10/08/21 2008 -- -- -- -- -- 98 %   10/08/21 2005 -- 97.7  F (36.5  C) -- -- -- --   10/08/21 1922 121/84 -- -- -- -- --   10/08/21 1918 -- -- -- -- 16 98 %   10/08/21 1900 -- -- -- -- -- 97 %   10/08/21 1838 -- -- -- -- -- 97 %   10/08/21 1833 -- -- -- -- -- 98 %   10/08/21 183 110/74 -- -- -- 18 99 %   10/08/21 1817 112/71 -- -- -- 18 99 %   10/08/21 1802 107/58 -- -- -- 16 98 %   10/08/21 1747 118/56 -- -- -- 16 98 %   10/08/21 1732 127/58 -- -- -- 16 98 %   10/08/21 1717 118/61 -- -- -- 18 98 %   10/08/21 1702 " 123/61 -- -- -- 16 99 %   10/08/21 1649 137/64 97.7  F (36.5  C) Oral -- 18 99 %   10/08/21 1232 109/69 98.4  F (36.9  C) Oral -- 16 --       The amount and color of the lochia is appropriate for the duration of recovery.  The uterine fundus is firm.  Urinary output is adequate.  The insorb incision is clean, dry and intact.    Lab Results   Component Value Date    AS Negative 10/07/2021    HGB 7.8 (L) 10/09/2021       Immunization History   Administered Date(s) Administered     COVID-19,PF,Pfizer 04/21/2021, 05/12/2021     DTaP, Unspecified 1990, 1990, 1990, 10/16/1991, 03/29/1993     Flu, Unspecified 03/10/2016     HepB, Unspecified 05/15/2000, 06/15/2000     MMR 06/27/1989, 05/15/2000     Poliovirus, inactivated (IPV) 1990, 1988, 1988, 10/16/1993     Td,adult,historic,unspecified 05/15/2000     Tdap (Adacel,Boostrix) 08/05/2009, 08/09/2016       Provider:  Gertrude Estrada CNM      Date:  10/9/2021  Time:  10:38 AM

## 2021-10-09 NOTE — ANESTHESIA PREPROCEDURE EVALUATION
Anesthesia Pre-Procedure Evaluation    Patient: Kavya Butts   MRN: [id number] : 1990        Preoperative Diagnosis: Gestational hypertension, third trimester [O13.3]    Procedure : Procedure(s):   SECTION          History reviewed. No pertinent past medical history.   Past Surgical History:   Procedure Laterality Date     CHOLECYSTECTOMY  2009     CHOLECYSTECTOMY       TONSILLECTOMY       WISDOM TOOTH EXTRACTION        Allergies   Allergen Reactions     Cat Hair Standardized Allergenic Extract [Cat Hair Extract] Other (See Comments)     Allergy tested      Social History     Tobacco Use     Smoking status: Never Smoker     Smokeless tobacco: Never Used   Substance Use Topics     Alcohol use: Yes     Alcohol/week: 2.0 standard drinks      Wt Readings from Last 1 Encounters:   10/07/21 103.2 kg (227 lb 9.6 oz)        Anesthesia Evaluation            ROS/MED HX  ENT/Pulmonary:  - neg pulmonary ROS     Neurologic:  - neg neurologic ROS     Cardiovascular:     (+) hypertension-----    METS/Exercise Tolerance:     Hematologic:  - neg hematologic  ROS     Musculoskeletal:  - neg musculoskeletal ROS     GI/Hepatic:  - neg GI/hepatic ROS     Renal/Genitourinary:  - neg Renal ROS     Endo:  - neg endo ROS     Psychiatric/Substance Use:  - neg psychiatric ROS     Infectious Disease:  - neg infectious disease ROS     Malignancy:  - neg malignancy ROS     Other: Comment: PREGNANT           Physical Exam    Airway        Mallampati: II       Respiratory Devices and Support         Dental  no notable dental history         Cardiovascular   cardiovascular exam normal          Pulmonary   pulmonary exam normal                OUTSIDE LABS:  CBC:   Lab Results   Component Value Date    WBC 7.3 10/07/2021    HGB 10.5 (L) 10/07/2021    HCT 32.8 (L) 10/07/2021     10/07/2021     BMP:   Lab Results   Component Value Date    CR 0.66 10/07/2021     COAGS: No results found for: PTT, INR, FIBR  POC: No results found  for: BGM, HCG, HCGS  HEPATIC:   Lab Results   Component Value Date    ALT <9 10/07/2021    AST 9 10/07/2021     OTHER: No results found for: PH, LACT, A1C, VALENCIA, PHOS, MAG, LIPASE, AMYLASE, TSH, T4, T3, CRP, SED    Anesthesia Plan    ASA Status:  2, emergent       Anesthesia Type: Spinal.              Consents            Postoperative Care    Pain management: Peripheral nerve block (Single Shot), intrathecal morphine.        Comments:    Preop was done by Dr. Cory Rodriguez, who did not chart a note, so this is being entered late.            Tiffani Torres MD

## 2021-10-09 NOTE — ANESTHESIA POSTPROCEDURE EVALUATION
Patient: Kavya Butts    Procedure: Procedure(s):   SECTION       Diagnosis:Gestational hypertension, third trimester [O13.3]  Diagnosis Additional Information: No value filed.    Anesthesia Type:  No value filed.    Note:  Disposition: Inpatient   Postop Pain Control: Uneventful            Sign Out: Well controlled pain   PONV: No   Neuro/Psych: Uneventful            Sign Out: Acceptable/Baseline neuro status   Airway/Respiratory: Uneventful            Sign Out: Acceptable/Baseline resp. status   CV/Hemodynamics: Uneventful            Sign Out: Acceptable CV status; No obvious hypovolemia; No obvious fluid overload   Other NRE: NONE   DID A NON-ROUTINE EVENT OCCUR?     Event details/Postop Comments:  Doing well after            Last vitals:  Vitals Value Taken Time   /81 10/08/21 2022   Temp 36.5  C (97.7  F) 10/08/21 2005   Pulse     Resp 18 10/08/21 2013   SpO2 97 % 10/08/21 2058   Vitals shown include unvalidated device data.    Electronically Signed By: Tiffani Torres MD  2021  9:03 PM

## 2021-10-10 PROCEDURE — 120N000001 HC R&B MED SURG/OB

## 2021-10-10 PROCEDURE — 250N000013 HC RX MED GY IP 250 OP 250 PS 637: Performed by: ADVANCED PRACTICE MIDWIFE

## 2021-10-10 PROCEDURE — 250N000013 HC RX MED GY IP 250 OP 250 PS 637: Performed by: OBSTETRICS & GYNECOLOGY

## 2021-10-10 RX ADMIN — SENNOSIDES AND DOCUSATE SODIUM 1 TABLET: 50; 8.6 TABLET ORAL at 11:42

## 2021-10-10 RX ADMIN — ACETAMINOPHEN 975 MG: 325 TABLET ORAL at 18:23

## 2021-10-10 RX ADMIN — IBUPROFEN 800 MG: 800 TABLET, FILM COATED ORAL at 18:24

## 2021-10-10 RX ADMIN — IBUPROFEN 800 MG: 800 TABLET, FILM COATED ORAL at 11:43

## 2021-10-10 RX ADMIN — FERROUS SULFATE TAB 325 MG (65 MG ELEMENTAL FE) 325 MG: 325 (65 FE) TAB at 21:10

## 2021-10-10 RX ADMIN — ACETAMINOPHEN 975 MG: 325 TABLET ORAL at 11:43

## 2021-10-10 RX ADMIN — ACETAMINOPHEN 975 MG: 325 TABLET ORAL at 06:23

## 2021-10-10 RX ADMIN — FERROUS SULFATE TAB 325 MG (65 MG ELEMENTAL FE) 325 MG: 325 (65 FE) TAB at 11:42

## 2021-10-10 RX ADMIN — IBUPROFEN 800 MG: 800 TABLET, FILM COATED ORAL at 06:23

## 2021-10-10 RX ADMIN — SENNOSIDES AND DOCUSATE SODIUM 2 TABLET: 50; 8.6 TABLET ORAL at 21:10

## 2021-10-10 NOTE — PLAN OF CARE
Problem: Adult Inpatient Plan of Care  Goal: Plan of Care Review  Outcome: Improving     Problem: Adult Inpatient Plan of Care  Goal: Readiness for Transition of Care  Outcome: Improving     VSS, independent in self and  cares.  Receiving scheduled tylenol and ibuprofen for pain.  Plans to discharge to home tomorrow.  Desires acupuncture and massage therapies prior to discharge.

## 2021-10-10 NOTE — PROGRESS NOTES
" Postpartum Day 2    Patient Name:  Kavya Butts  :  1990  MRN:  [id number]      Assessment:  Normal postpartum course. Acute blood loss anemia.    Plan:  Continue current care. PO ferrous sulfate. Discharge home tomorrow.      Subjective:  The patient feels well:  Voiding without difficulty, lochia normal, tolerating normal diet, and passing flatus.  Pain is well controlled with current medications.  The patient has no emotional concerns.  The baby is well.    Objective:  /82   Pulse 87   Temp 98.4  F (36.9  C) (Oral)   Resp 20   Ht 1.727 m (5' 8\")   Wt 103.2 kg (227 lb 9.6 oz)   SpO2 99%   Breastfeeding Yes   BMI 34.61 kg/m    Patient Vitals for the past 24 hrs:   BP Temp Temp src Pulse Resp SpO2   10/09/21 2315 128/82 98.4  F (36.9  C) Oral 87 20 99 %   10/09/21 1713 119/82 98  F (36.7  C) Oral 80 -- --   10/09/21 1330 120/81 98.4  F (36.9  C) -- 74 16 98 %   10/09/21 1130 -- -- -- -- 16 99 %       The amount and color of the lochia is appropriate for the duration of recovery.  The uterine fundus is firm.  Urinary output is adequate.  The incision is healing well.  The patient is ambulating well.  The patient is tolerating a regular diet.    Lab Results   Component Value Date    AS Negative 10/07/2021    HGB 7.8 (L) 10/09/2021       Immunization History   Administered Date(s) Administered     COVID-19,PF,Pfizer 2021, 2021     DTaP, Unspecified 1990, 1988, 1988, 10/16/1991, 1993     Flu, Unspecified 03/10/2016     HepB, Unspecified 05/15/2000, 06/15/2000     MMR 1989, 05/15/2000     Poliovirus, inactivated (IPV) 1988, 1988, 1988, 10/16/1993     Td,adult,historic,unspecified 05/15/2000     Tdap (Adacel,Boostrix) 2009, 2016         Provider:  Gertrude Estrada CNM      Date:  10/10/2021  Time:  10:42 AM  "

## 2021-10-11 VITALS
DIASTOLIC BLOOD PRESSURE: 84 MMHG | RESPIRATION RATE: 18 BRPM | HEIGHT: 68 IN | WEIGHT: 227.6 LBS | OXYGEN SATURATION: 100 % | BODY MASS INDEX: 34.49 KG/M2 | SYSTOLIC BLOOD PRESSURE: 138 MMHG | HEART RATE: 82 BPM | TEMPERATURE: 98 F

## 2021-10-11 PROCEDURE — 250N000013 HC RX MED GY IP 250 OP 250 PS 637: Performed by: OBSTETRICS & GYNECOLOGY

## 2021-10-11 PROCEDURE — 250N000013 HC RX MED GY IP 250 OP 250 PS 637: Performed by: ADVANCED PRACTICE MIDWIFE

## 2021-10-11 RX ORDER — FERROUS SULFATE 325(65) MG
325 TABLET ORAL 2 TIMES DAILY
Qty: 30 TABLET | Refills: 0 | Status: SHIPPED | OUTPATIENT
Start: 2021-10-11

## 2021-10-11 RX ORDER — OXYCODONE HYDROCHLORIDE 5 MG/1
5 TABLET ORAL EVERY 4 HOURS PRN
Qty: 13 TABLET | Refills: 0 | Status: SHIPPED | OUTPATIENT
Start: 2021-10-11

## 2021-10-11 RX ORDER — IBUPROFEN 800 MG/1
800 TABLET, FILM COATED ORAL EVERY 6 HOURS
Qty: 30 TABLET | Refills: 0 | Status: SHIPPED | OUTPATIENT
Start: 2021-10-11

## 2021-10-11 RX ORDER — ACETAMINOPHEN 325 MG/1
975 TABLET ORAL EVERY 6 HOURS
Qty: 30 TABLET | Refills: 0 | Status: SHIPPED | OUTPATIENT
Start: 2021-10-11

## 2021-10-11 RX ORDER — AMOXICILLIN 250 MG
1 CAPSULE ORAL 2 TIMES DAILY
Qty: 14 TABLET | Refills: 0 | Status: SHIPPED | OUTPATIENT
Start: 2021-10-11

## 2021-10-11 RX ADMIN — IBUPROFEN 800 MG: 800 TABLET, FILM COATED ORAL at 00:05

## 2021-10-11 RX ADMIN — ACETAMINOPHEN 975 MG: 325 TABLET ORAL at 12:32

## 2021-10-11 RX ADMIN — ACETAMINOPHEN 975 MG: 325 TABLET ORAL at 06:01

## 2021-10-11 RX ADMIN — SENNOSIDES AND DOCUSATE SODIUM 1 TABLET: 50; 8.6 TABLET ORAL at 09:09

## 2021-10-11 RX ADMIN — ACETAMINOPHEN 975 MG: 325 TABLET ORAL at 00:05

## 2021-10-11 RX ADMIN — FERROUS SULFATE TAB 325 MG (65 MG ELEMENTAL FE) 325 MG: 325 (65 FE) TAB at 09:09

## 2021-10-11 RX ADMIN — IBUPROFEN 800 MG: 800 TABLET, FILM COATED ORAL at 12:33

## 2021-10-11 RX ADMIN — IBUPROFEN 800 MG: 800 TABLET, FILM COATED ORAL at 06:00

## 2021-10-11 NOTE — DISCHARGE INSTRUCTIONS
Discharge Instructions for  Section ()  You had a  section, or . During the , your baby was delivered through an incision in your stomach and uterus. Full recovery after a  can take time. It s important to take care of yourself -- for your own sake and because your new baby needs you. Here are some guidelines to follow at home.  Incision care  Here's how to take care of your incision:    Shower as needed. Pat your incision dry.    Watch your incision for signs of infection, like more redness or drainage.    Hold a pillow against the incision when you laugh or cough and when you get up from a lying or sitting position.    Remember, it can take as long as 6 weeks for your incision to heal.  Activity  Here are some suggestions:    Don t try to take care of anyone other than your baby and yourself.    Remember, the more active you are, the more likely you are to have an increase in your bleeding.    Get lots of rest. Take naps in the afternoon.    Increase your activities bit by bit.    Plan your activities so that you don t have to go up or down stairs more than needed.    Do postsurgical deep breathing and coughing exercises. Ask your healthcare provider for instructions.    Don t lift anything heavier than your baby until your healthcare provider tells you it s OK.    Don t drive until your healthcare provider says it s OK.    Don t have sexual intercourse until after you ve had a checkup with your healthcare provider and you have decided on a birth control method.    Allow others to do things for you. Don't hesitate to ask for help.  Follow-up  Make a follow-up appointment as directed by our staff.  When to call your healthcare provider  Call your healthcare provider right away if you have any of these:    Fever of 100.4 F (38 C) or higher    Redness, pain, or drainage at your incision site    Bleeding that requires a new sanitary pad every hour    Severe pain in  the abdomen    Pain or urgency with urination    Foul odor from vaginal discharge    Trouble urinating or emptying your bladder    No bowel movement within 1 week after the birth of your baby    Swollen, red, painful area in the leg    Appearance of rash or hives    Sore, red, painful area on the breasts that may come with flu-like symptoms    Feelings of anxiety, panic, and/or depression  Rufino last reviewed this educational content on 2/1/2018 2000-2021 [employer]. All rights reserved. This information is not intended as a substitute for professional medical care. Always follow your healthcare professional's instructions.

## 2021-10-11 NOTE — PLAN OF CARE
Infant doing well with bottle formula feeding. Taking in approximately 30 mL with each feed.  Shirlene Curiel RN

## 2021-10-11 NOTE — CONSULTS
"ACUPUNCTURIST TREATMENT NOTE    Name: Kavya Butts  :  1990  MRN:  [id number]    Acupuncture Treatment  Patient Type: Maternity  Intervention Reason: Pain  Pain Location: Low back  Pre-session Pain Rating: 3  Post-session Pain Ratin  Patient complaint:: Low back pain and edema of lower legs  Initial insertions: Yin sutton, Li 10, St 36, Sp 6, 9     \"Risks and benefits of acupuncture were discussed with patient. Consent for treatment was given. We thank you for the referral.\"     Ahsan Hensley    Date:  10/11/2021  Time:  1:01 PM    "

## 2021-10-11 NOTE — DISCHARGE SUMMARY
OB Discharge Summary      Date:  10/11/2021    Name:  Kavya Butts  :  1990  MRN:  [id number]      Admission Date:  10/7/2021  Delivery Date: 10/8/21  Gestational Age at Delivery:  38w2d  Discharge Date:  10/11/2021    Principal Diagnosis:    Patient Active Problem List   Diagnosis     Encounter for triage in pregnant patient     Gestational hypertension, third trimester       Procedure(s) Performed:      Condition at Discharge:  Stable    Discharge Medications:    Kavya Butts   Home Medication Instructions NATA:[id number]    Printed on:10/11/21 1058   Medication Information                      acetaminophen (TYLENOL) 325 MG tablet  Take 3 tablets (975 mg) by mouth every 6 hours             ferrous sulfate (FEROSUL) 325 (65 Fe) MG tablet  Take 1 tablet (325 mg) by mouth 2 times daily             ibuprofen (ADVIL/MOTRIN) 800 MG tablet  Take 1 tablet (800 mg) by mouth every 6 hours             oxyCODONE (ROXICODONE) 5 MG tablet  Take 1 tablet (5 mg) by mouth every 4 hours as needed for moderate to severe pain             senna-docusate (SENOKOT-S/PERICOLACE) 8.6-50 MG tablet  Take 1 tablet by mouth 2 times daily                 Discharge Plan:    Follow up with /CNM:  In two and six weeks   Patient Instructions:      Physical activity: As tolerated    Diet:  Regular    Medication:  As listed above    Other:        Physician/CNM:  ERICK OlmedoM    Name:  Kavya Butts  :  1990  MRN:  [id number]

## 2021-10-11 NOTE — PLAN OF CARE
Patient reports generalized weakness. Able to ambulate without dizziness/lightheadedness. Skin is pale. BP/HR WNL. On iron supplements. Did report that she had loose stools yesterday. Bowel sounds active.   Shirlene Curiel RN

## 2021-10-12 LAB
PATH REPORT.COMMENTS IMP SPEC: NORMAL
PATH REPORT.FINAL DX SPEC: NORMAL
PATH REPORT.GROSS SPEC: NORMAL
PATH REPORT.MICROSCOPIC SPEC OTHER STN: NORMAL
PATH REPORT.RELEVANT HX SPEC: NORMAL
PHOTO IMAGE: NORMAL

## 2021-10-12 PROCEDURE — 88307 TISSUE EXAM BY PATHOLOGIST: CPT | Mod: 26 | Performed by: PATHOLOGY

## 2023-04-11 ENCOUNTER — HOSPITAL ENCOUNTER (EMERGENCY)
Facility: CLINIC | Age: 33
Discharge: HOME | End: 2023-04-11
Attending: EMERGENCY MEDICINE | Admitting: EMERGENCY MEDICINE
Payer: COMMERCIAL

## 2023-04-11 ENCOUNTER — APPOINTMENT (OUTPATIENT)
Dept: CT IMAGING | Facility: CLINIC | Age: 33
End: 2023-04-11
Attending: EMERGENCY MEDICINE
Payer: COMMERCIAL

## 2023-04-11 VITALS
BODY MASS INDEX: 32.58 KG/M2 | DIASTOLIC BLOOD PRESSURE: 75 MMHG | TEMPERATURE: 98.5 F | HEART RATE: 89 BPM | OXYGEN SATURATION: 100 % | RESPIRATION RATE: 18 BRPM | HEIGHT: 68 IN | WEIGHT: 215 LBS | SYSTOLIC BLOOD PRESSURE: 123 MMHG

## 2023-04-11 DIAGNOSIS — R10.12 LUQ ABDOMINAL PAIN: ICD-10-CM

## 2023-04-11 LAB
ALBUMIN SERPL-MCNC: 3.6 G/DL (ref 3.5–5)
ALBUMIN UR-MCNC: 20 MG/DL
ALP SERPL-CCNC: 52 U/L (ref 45–120)
ALT SERPL W P-5'-P-CCNC: 63 U/L (ref 0–45)
ANION GAP SERPL CALCULATED.3IONS-SCNC: 12 MMOL/L (ref 5–18)
APPEARANCE UR: CLEAR
AST SERPL W P-5'-P-CCNC: 70 U/L (ref 0–40)
BILIRUB DIRECT SERPL-MCNC: 0.1 MG/DL
BILIRUB SERPL-MCNC: 0.3 MG/DL (ref 0–1)
BILIRUB UR QL STRIP: NEGATIVE
BUN SERPL-MCNC: 10 MG/DL (ref 8–22)
CALCIUM SERPL-MCNC: 8.7 MG/DL (ref 8.5–10.5)
CHLORIDE BLD-SCNC: 107 MMOL/L (ref 98–107)
CO2 SERPL-SCNC: 17 MMOL/L (ref 22–31)
COLOR UR AUTO: YELLOW
CREAT SERPL-MCNC: 0.63 MG/DL (ref 0.6–1.1)
ERYTHROCYTE [DISTWIDTH] IN BLOOD BY AUTOMATED COUNT: 14.4 % (ref 10–15)
GFR SERPL CREATININE-BSD FRML MDRD: >90 ML/MIN/1.73M2
GLUCOSE BLD-MCNC: 87 MG/DL (ref 70–125)
GLUCOSE UR STRIP-MCNC: NEGATIVE MG/DL
HCG UR QL: NEGATIVE
HCT VFR BLD AUTO: 39.6 % (ref 35–47)
HGB BLD-MCNC: 12.9 G/DL (ref 11.7–15.7)
HGB UR QL STRIP: NEGATIVE
KETONES UR STRIP-MCNC: NEGATIVE MG/DL
LEUKOCYTE ESTERASE UR QL STRIP: NEGATIVE
LIPASE SERPL-CCNC: 11 U/L (ref 0–52)
MCH RBC QN AUTO: 26.5 PG (ref 26.5–33)
MCHC RBC AUTO-ENTMCNC: 32.6 G/DL (ref 31.5–36.5)
MCV RBC AUTO: 82 FL (ref 78–100)
MUCOUS THREADS #/AREA URNS LPF: PRESENT /LPF
NITRATE UR QL: NEGATIVE
PH UR STRIP: 5.5 [PH] (ref 5–7)
PLATELET # BLD AUTO: 334 10E3/UL (ref 150–450)
POTASSIUM BLD-SCNC: 3.9 MMOL/L (ref 3.5–5)
PROT SERPL-MCNC: 7.1 G/DL (ref 6–8)
RBC # BLD AUTO: 4.86 10E6/UL (ref 3.8–5.2)
RBC URINE: 1 /HPF
SODIUM SERPL-SCNC: 136 MMOL/L (ref 136–145)
SP GR UR STRIP: 1.03 (ref 1–1.03)
SQUAMOUS EPITHELIAL: 4 /HPF
UROBILINOGEN UR STRIP-MCNC: 2 MG/DL
WBC # BLD AUTO: 4.8 10E3/UL (ref 4–11)
WBC URINE: 1 /HPF

## 2023-04-11 PROCEDURE — 36415 COLL VENOUS BLD VENIPUNCTURE: CPT | Performed by: EMERGENCY MEDICINE

## 2023-04-11 PROCEDURE — 81025 URINE PREGNANCY TEST: CPT | Performed by: EMERGENCY MEDICINE

## 2023-04-11 PROCEDURE — 80053 COMPREHEN METABOLIC PANEL: CPT | Performed by: EMERGENCY MEDICINE

## 2023-04-11 PROCEDURE — 74177 CT ABD & PELVIS W/CONTRAST: CPT

## 2023-04-11 PROCEDURE — 83690 ASSAY OF LIPASE: CPT | Performed by: EMERGENCY MEDICINE

## 2023-04-11 PROCEDURE — 81001 URINALYSIS AUTO W/SCOPE: CPT | Performed by: EMERGENCY MEDICINE

## 2023-04-11 PROCEDURE — 85027 COMPLETE CBC AUTOMATED: CPT | Performed by: EMERGENCY MEDICINE

## 2023-04-11 PROCEDURE — 82248 BILIRUBIN DIRECT: CPT | Performed by: EMERGENCY MEDICINE

## 2023-04-11 PROCEDURE — 99285 EMERGENCY DEPT VISIT HI MDM: CPT | Mod: 25

## 2023-04-11 PROCEDURE — 250N000011 HC RX IP 250 OP 636: Performed by: EMERGENCY MEDICINE

## 2023-04-11 RX ORDER — IOPAMIDOL 755 MG/ML
100 INJECTION, SOLUTION INTRAVASCULAR ONCE
Status: COMPLETED | OUTPATIENT
Start: 2023-04-11 | End: 2023-04-11

## 2023-04-11 RX ADMIN — IOPAMIDOL 100 ML: 755 INJECTION, SOLUTION INTRAVENOUS at 15:08

## 2023-04-11 ASSESSMENT — ACTIVITIES OF DAILY LIVING (ADL)
ADLS_ACUITY_SCORE: 33
ADLS_ACUITY_SCORE: 35

## 2023-04-11 NOTE — ED PROVIDER NOTES
EMERGENCY DEPARTMENT ENCOUnter      NAME: Kavya Butts  AGE: 33 year old female  YOB: 1990  MRN: [id number]  EVALUATION DATE & TIME: 2023 12:58 PM    PCP: No Ref-Primary, Physician    ED PROVIDER: Parviz Purvis DO      Chief Complaint   Patient presents with     Abdominal Pain         FINAL IMPRESSION:  1. LUQ abdominal pain          ED COURSE & MEDICAL DECISION MAKIN:20 PM I introduced myself to the patient, obtained patient history, performed a physical exam, and discussed plan for ED workup including potential diagnostic laboratory/imaging studies and interventions.      The patient presented to the emergency department today complaining of left upper quadrant pain which began this morning.  She had mild tenderness in this area on exam but otherwise appeared well.  Laboratory testing is unremarkable.  Her CT reveals evidence of 2 nonobstructing kidney stones but nothing to explain the patient's symptoms.  The patient is feeling well and is comfortable with the plan for discharge home.  Further testing was considered but deferred given her well appearance and her normal work-up thus far.  I have encouraged her to follow-up closely as an outpatient and return for worsening symptoms or other concerns.        Medical Decision Making    History:    Supplemental history from: N/A    External Record(s) reviewed: Documented in chart, if applicable.    Work Up:    Chart documentation includes differential considered and any EKGs or imaging independently interpreted by provider, where specified.    In additional to work up documented, I considered the following work up: Documented in chart, if applicable.    External consultation:    Discussion of management with another provider: Documented in chart, if applicable    Complicating factors:    Care impacted by chronic illness: N/A    Care affected by social determinants of health: N/A    Disposition considerations: Discharge. No recommendations  on prescription strength medication(s). I considered admission, but discharged the patient after share decision making conversation.        At the conclusion of the encounter I discussed the results of all of the tests and the disposition. The questions were answered. The patient or family acknowledged understanding and was agreeable with the care plan.         MEDICATIONS GIVEN IN THE EMERGENCY:  Medications   iopamidol (ISOVUE-370) solution 100 mL (100 mLs Intravenous $Given 23 9834)         =================================================================    HPI        Kavya Butts is a 33 year old female with a pertinent history of s/p cholecystectomy and  section who presents to this ED via walk in for evaluation of abdominal pain.    The patient reports she woke up this morning with LUQ abdominal pain. The pain has waxed and waned in intensity throughout the day. The pain is non radiating. The pain is worse with deep breathing. She endorses feeling fatigued. She denies any history of similar abdominal pain. She denies associated nausea, vomiting, urinary symptoms, or bowel changes. She is getting over a case of bronchitis, but has not had any respiratory changes since yesterday. She denies any other complaints at this time.    REVIEW OF SYSTEMS     Constitutional:  Denies fever or chills. Endorses fatigue.  HENT:  Denies sore throat   Respiratory:  Denies cough or shortness of breath   Cardiovascular:  Denies chest pain or palpitations  GI:  Denies nausea or vomiting, bowel changes. Endorses abdominal pain.  : Denies urinary symptoms.  Musculoskeletal:  Denies any new extremity pain   Skin:  Denies rash   Neurologic:  Denies headache, focal weakness or sensory changes    All other systems reviewed and are negative      PAST MEDICAL HISTORY:  History reviewed. No pertinent past medical history.    PAST SURGICAL HISTORY:  Past Surgical History:   Procedure Laterality Date      SECTION N/A  "10/8/2021    Procedure:  SECTION;  Surgeon: Buddy Hall MD;  Location: [facility] OR     CHOLECYSTECTOMY       CHOLECYSTECTOMY       TONSILLECTOMY       WISDOM TOOTH EXTRACTION             CURRENT MEDICATIONS:    acetaminophen (TYLENOL) 325 MG tablet  ferrous sulfate (FEROSUL) 325 (65 Fe) MG tablet  ibuprofen (ADVIL/MOTRIN) 800 MG tablet  oxyCODONE (ROXICODONE) 5 MG tablet  senna-docusate (SENOKOT-S/PERICOLACE) 8.6-50 MG tablet        ALLERGIES:  Allergies   Allergen Reactions     Cat Hair Standardized Allergenic Extract [Cat Hair Extract] Other (See Comments)     Allergy tested       FAMILY HISTORY:  Family History   Problem Relation Age of Onset     Arthritis Father      Arthritis Sister      Arthritis Maternal Grandmother      Dementia Maternal Grandmother      Pancreatic Cancer Maternal Grandfather      Heart Disease Paternal Grandmother      Cerebrovascular Disease Paternal Grandfather        SOCIAL HISTORY:   Social History     Socioeconomic History     Marital status:      Spouse name: None     Number of children: None     Years of education: None     Highest education level: None   Tobacco Use     Smoking status: Never     Smokeless tobacco: Never   Substance and Sexual Activity     Alcohol use: Yes     Alcohol/week: 2.0 standard drinks of alcohol     Drug use: No     Sexual activity: Not Currently       VITALS:  Patient Vitals for the past 24 hrs:   BP Temp Temp src Pulse Resp SpO2 Height Weight   23 1516 123/75 -- -- 89 -- 100 % -- --   23 1213 (!) 146/98 98.5  F (36.9  C) Temporal 91 18 100 % 1.727 m (5' 8\") 97.5 kg (215 lb)       PHYSICAL EXAM    Constitutional:  Well developed, Well nourished,  HENT:  Normocephalic, Atraumatic, Oropharynx moist, Nose normal.   Eyes:  EOMI, Conjunctiva normal, No discharge.   Respiratory:  Normal breath sounds, No respiratory distress, No wheezing, No chest tenderness.   Cardiovascular:  Normal heart rate, Normal rhythm, No " murmurs  GI:  Soft, Mild LUQ tenderness, No lower abdominal tenderness, No guarding, No CVA tenderness.   Musculoskeletal:  No tenderness to palpation or major deformities noted.   Extremities: No lower extremity edema.  Neurologic:  Alert & oriented x 3, No focal deficits noted.   Psychiatric:  Affect normal, Judgment normal, Mood normal.        LAB:  All pertinent labs reviewed and interpreted.  Results for orders placed or performed during the hospital encounter of 04/11/23              CBC with platelets   Result Value Ref Range    WBC Count 4.8 4.0 - 11.0 10e3/uL    RBC Count 4.86 3.80 - 5.20 10e6/uL    Hemoglobin 12.9 11.7 - 15.7 g/dL    Hematocrit 39.6 35.0 - 47.0 %    MCV 82 78 - 100 fL    MCH 26.5 26.5 - 33.0 pg    MCHC 32.6 31.5 - 36.5 g/dL    RDW 14.4 10.0 - 15.0 %    Platelet Count 334 150 - 450 10e3/uL   Basic metabolic panel   Result Value Ref Range    Sodium 136 136 - 145 mmol/L    Potassium 3.9 3.5 - 5.0 mmol/L    Chloride 107 98 - 107 mmol/L    Carbon Dioxide (CO2) 17 (L) 22 - 31 mmol/L    Anion Gap 12 5 - 18 mmol/L    Urea Nitrogen 10 8 - 22 mg/dL    Creatinine 0.63 0.60 - 1.10 mg/dL    Calcium 8.7 8.5 - 10.5 mg/dL    Glucose 87 70 - 125 mg/dL    GFR Estimate >90 >60 mL/min/1.73m2   Hepatic function panel   Result Value Ref Range    Bilirubin Total 0.3 0.0 - 1.0 mg/dL    Bilirubin Direct 0.1 <=0.5 mg/dL    Protein Total 7.1 6.0 - 8.0 g/dL    Albumin 3.6 3.5 - 5.0 g/dL    Alkaline Phosphatase 52 45 - 120 U/L    AST 70 (H) 0 - 40 U/L    ALT 63 (H) 0 - 45 U/L   Result Value Ref Range    Lipase 11 0 - 52 U/L   UA with Microscopic reflex to Culture    Specimen: Urine, Clean Catch   Result Value Ref Range    Color Urine Yellow Colorless, Straw, Light Yellow, Yellow    Appearance Urine Clear Clear    Glucose Urine Negative Negative mg/dL    Bilirubin Urine Negative Negative    Ketones Urine Negative Negative mg/dL    Specific Gravity Urine 1.030 1.001 - 1.030    Blood Urine Negative Negative    pH Urine  5.5 5.0 - 7.0    Protein Albumin Urine 20 (A) Negative mg/dL    Urobilinogen Urine 2.0 (A) <2.0 mg/dL    Nitrite Urine Negative Negative    Leukocyte Esterase Urine Negative Negative    Mucus Urine Present (A) None Seen /LPF    RBC Urine 1 <=2 /HPF    WBC Urine 1 <=5 /HPF    Squamous Epithelials Urine 4 (H) <=1 /HPF   HCG qualitative urine   Result Value Ref Range    hCG Urine Qualitative Negative Negative       RADIOLOGY:  I have independently reviewed and interpreted the above imaging, pending the final radiology read.  CT Abdomen Pelvis w Contrast   Final Result   IMPRESSION:       1.  Adjacent 2 mm and 3 mm nonobstructing left renal stones.      2.  No other collecting system stones. No hydronephrosis.      3.  Unremarkable bowel and appendix. No obstruction.      4.  Cholecystectomy. No biliary dilatation.      5.  Mild hepatic steatosis.                  I, Tavo Tinoco, am serving as a scribe to document services personally performed by Dr. Purvis based on my observation and the provider's statements to me. I, Parviz Purvis, DO attest that Tavo Tinoco is acting in a scribe capacity, has observed my performance of the services and has documented them in accordance with my direction.    Parviz Purvis, DO  Emergency Medicine  [facility] EMERGENCY ROOM  [address] [facility] [address] [phone]  Dept: [phone]     Parviz Purvis MD  04/11/23 4470

## 2023-04-11 NOTE — DISCHARGE INSTRUCTIONS
Fortunately all of your testing today has been normal.  Your symptoms may be related to gastritis.  You may try taking over-the-counter Pepcid to see if this helps with your symptoms.  Follow-up closely with your primary care doctor and return to the ER for any worsening symptoms or other concerns.

## 2024-11-12 ENCOUNTER — HOSPITAL ENCOUNTER (OUTPATIENT)
Facility: AMBULATORY SURGERY CENTER | Age: 34
End: 2024-11-12
Attending: OBSTETRICS & GYNECOLOGY
Payer: COMMERCIAL

## (undated) DEVICE — SU PLAIN 3-0 XLH  27" 52T

## (undated) DEVICE — DRAPE IOBAN 2 C-SECTION 3M 6697

## (undated) DEVICE — PACK MAJOR BASIN 673

## (undated) DEVICE — PLATE GROUNDING ADULT W/CORD 9165L

## (undated) DEVICE — GOWN SURGICAL SMARTGOWN S/M

## (undated) DEVICE — Device

## (undated) DEVICE — SU CHROMIC 0 CT 36" 914H

## (undated) DEVICE — STPL SKIN SUBCUTICULAR INSORB  2030

## (undated) DEVICE — UNDERPAD 30X30 BULK 949B10

## (undated) DEVICE — HOVERMAT DISPOSABLE 34IN AIR MATT AM34D

## (undated) DEVICE — SUCTION MANIFOLD NEPTUNE 2 SYS 1 PORT 702-025-000

## (undated) DEVICE — CATH SUCTION 10FR W/TRAP DYND44110

## (undated) DEVICE — DRESSING MEPILEX BORDER POST-OP 4X12

## (undated) DEVICE — PREP DURAPREP 26ML APL 8630

## (undated) DEVICE — PACK MINOR SINGLE BASIN SSK3001

## (undated) DEVICE — PAD INSERT XL WHITE 9 1/4 X 22 0712144

## (undated) DEVICE — ESU LIGASURE OPEN SEALER/DIVIDER SM JAW 16.5MM LF1212A

## (undated) DEVICE — SURGICEL POWDER ABSORBABLE HEMOSTAT 3GM 3013SP

## (undated) DEVICE — PAD INSERT MED PEACH 14X6.5 62550

## (undated) DEVICE — LINER PRINTED RED HAZARD 24X24 A4824PRRO

## (undated) DEVICE — SOL NACL 0.9% IRRIG 1000ML BOTTLE 2F7124

## (undated) DEVICE — CUSTOM PACK C-SECTION LHE

## (undated) DEVICE — GLOVE BIOGEL PI ULTRATOUCH G SZ 7.5 42175